# Patient Record
Sex: FEMALE | Race: WHITE | NOT HISPANIC OR LATINO | Employment: FULL TIME | URBAN - METROPOLITAN AREA
[De-identification: names, ages, dates, MRNs, and addresses within clinical notes are randomized per-mention and may not be internally consistent; named-entity substitution may affect disease eponyms.]

---

## 2017-01-09 ENCOUNTER — RECORDS - HEALTHEAST (OUTPATIENT)
Dept: BONE DENSITY | Facility: CLINIC | Age: 57
End: 2017-01-09

## 2017-01-09 ENCOUNTER — RECORDS - HEALTHEAST (OUTPATIENT)
Dept: MAMMOGRAPHY | Facility: CLINIC | Age: 57
End: 2017-01-09

## 2017-01-09 ENCOUNTER — RECORDS - HEALTHEAST (OUTPATIENT)
Dept: ADMINISTRATIVE | Facility: OTHER | Age: 57
End: 2017-01-09

## 2017-01-09 ENCOUNTER — TRANSFERRED RECORDS (OUTPATIENT)
Dept: HEALTH INFORMATION MANAGEMENT | Facility: CLINIC | Age: 57
End: 2017-01-09

## 2017-01-09 DIAGNOSIS — M94.9 DISORDER OF CARTILAGE, UNSPECIFIED: ICD-10-CM

## 2017-01-09 DIAGNOSIS — Z12.31 ENCOUNTER FOR SCREENING MAMMOGRAM FOR MALIGNANT NEOPLASM OF BREAST: ICD-10-CM

## 2017-01-09 DIAGNOSIS — M89.9 DISORDER OF BONE, UNSPECIFIED: ICD-10-CM

## 2017-03-01 ENCOUNTER — COMMUNICATION - HEALTHEAST (OUTPATIENT)
Dept: FAMILY MEDICINE | Facility: CLINIC | Age: 57
End: 2017-03-01

## 2017-03-01 DIAGNOSIS — E78.00 PURE HYPERCHOLESTEROLEMIA: ICD-10-CM

## 2017-03-06 ENCOUNTER — AMBULATORY - HEALTHEAST (OUTPATIENT)
Dept: LAB | Facility: CLINIC | Age: 57
End: 2017-03-06

## 2017-03-06 DIAGNOSIS — E78.00 PURE HYPERCHOLESTEROLEMIA: ICD-10-CM

## 2017-03-06 LAB
CHOLEST SERPL-MCNC: 242 MG/DL
FASTING STATUS PATIENT QL REPORTED: YES
HDLC SERPL-MCNC: 65 MG/DL
LDLC SERPL CALC-MCNC: 158 MG/DL
TRIGL SERPL-MCNC: 96 MG/DL

## 2017-07-10 DIAGNOSIS — B00.9 HSV (HERPES SIMPLEX VIRUS) INFECTION: ICD-10-CM

## 2017-07-10 RX ORDER — VALACYCLOVIR HYDROCHLORIDE 500 MG/1
500 TABLET, FILM COATED ORAL DAILY
Qty: 90 TABLET | Refills: 0 | Status: SHIPPED | OUTPATIENT
Start: 2017-07-10 | End: 2017-08-30

## 2017-08-30 ENCOUNTER — OFFICE VISIT (OUTPATIENT)
Dept: OBGYN | Facility: CLINIC | Age: 57
End: 2017-08-30
Payer: COMMERCIAL

## 2017-08-30 VITALS
WEIGHT: 155 LBS | SYSTOLIC BLOOD PRESSURE: 118 MMHG | BODY MASS INDEX: 25.83 KG/M2 | DIASTOLIC BLOOD PRESSURE: 80 MMHG | HEIGHT: 65 IN

## 2017-08-30 DIAGNOSIS — B00.9 HSV (HERPES SIMPLEX VIRUS) INFECTION: ICD-10-CM

## 2017-08-30 DIAGNOSIS — M85.88 OSTEOPENIA OF SPINE: ICD-10-CM

## 2017-08-30 DIAGNOSIS — Z01.419 ENCOUNTER FOR GYNECOLOGICAL EXAMINATION WITHOUT ABNORMAL FINDING: Primary | ICD-10-CM

## 2017-08-30 DIAGNOSIS — Z13.220 SCREENING FOR LIPOID DISORDERS: ICD-10-CM

## 2017-08-30 PROCEDURE — 99396 PREV VISIT EST AGE 40-64: CPT | Performed by: OBSTETRICS & GYNECOLOGY

## 2017-08-30 PROCEDURE — G0145 SCR C/V CYTO,THINLAYER,RESCR: HCPCS | Performed by: OBSTETRICS & GYNECOLOGY

## 2017-08-30 PROCEDURE — 87624 HPV HI-RISK TYP POOLED RSLT: CPT | Performed by: OBSTETRICS & GYNECOLOGY

## 2017-08-30 RX ORDER — VALACYCLOVIR HYDROCHLORIDE 500 MG/1
500 TABLET, FILM COATED ORAL DAILY
Qty: 90 TABLET | Refills: 11 | Status: SHIPPED | OUTPATIENT
Start: 2017-08-30 | End: 2022-09-20

## 2017-08-30 NOTE — MR AVS SNAPSHOT
After Visit Summary   8/30/2017    Andreia Jorgensen    MRN: [id number]           Patient Information     Date Of Birth          1960        Visit Information        Provider Department      8/30/2017 8:00 AM Sunni Roman MD [facility]        Today's Diagnoses     Encounter for gynecological examination without abnormal finding    -  1    Osteopenia of spine        HSV (herpes simplex virus) infection        Screening for lipoid disorders          Care Instructions    Make lab only appointment fasting (nothing but water 10 hours prior to blood draw) at any [facility].            Follow-ups after your visit        Additional Services     [facility] PT, HAND, AND CHIROPRACTIC REFERRAL       **This order will print in the [facility] Scheduling Office**    Physical Therapy, Hand Therapy and Chiropractic Care are available through:    *[facility]  *[facility]  *[facility]    Call one number to schedule at any of the above locations: [phone].    Your provider has referred you to: Physical Therapy at [facility] or FS    Indication/Reason for Referral: osteopenia of hip and spine (spine worsening)  Onset of Illness: years  Therapy Orders: Evaluate and Treat  Special Programs: None  Special Request: None    Shirin Carpio      Additional Comments for the Therapist or Chiropractor: recent DEXA with worsening of spine, help with exercises     Please be aware that coverage of these services is subject to the terms and limitations of your health insurance plan.  Call member services at your health plan with any benefit or coverage questions.      Please bring the following to your appointment:    *Your personal calendar for scheduling future appointments  *Comfortable clothing                  Future tests that were ordered for you today     Open Future Orders        Priority Expected Expires Ordered    Lipid Profile Routine   "2018            Who to contact     If you have questions or need follow up information about today's clinic visit or your schedule please contact [facility] directly at [phone].  Normal or non-critical lab and imaging results will be communicated to you by MyChart, letter or phone within 4 business days after the clinic has received the results. If you do not hear from us within 7 days, please contact the clinic through MyChart or phone. If you have a critical or abnormal lab result, we will notify you by phone as soon as possible.  Submit refill requests through Drync or call your pharmacy and they will forward the refill request to us. Please allow 3 business days for your refill to be completed.          Additional Information About Your Visit        [employer] Discovery[employer] Information     Drync lets you send messages to your doctor, view your test results, renew your prescriptions, schedule appointments and more. To sign up, go to [email or web address] . Click on \"Log in\" on the left side of the screen, which will take you to the Welcome page. Then click on \"Sign up Now\" on the right side of the page.     You will be asked to enter the access code listed below, as well as some personal information. Please follow the directions to create your username and password.     Your access code is: [email or web address]  Expires: 2017  8:39 AM     Your access code will  in 90 days. If you need help or a new code, please call your [facility] clinic or [phone].        Care EveryWhere ID     This is your Care EveryWhere ID. This could be used by other organizations to access your [facility] medical records  [id number]        Your Vitals Were     Height BMI (Body Mass Index)                5' 5\" (1.651 m) 25.79 kg/m2           Blood Pressure from Last 3 Encounters:   17 118/80   16 128/86   03/13/15 118/77    Weight from Last 3 Encounters:   17 155 lb (70.3 kg)   16 " 151 lb (68.5 kg)   03/13/15 151 lb 6.4 oz (68.7 kg)              We Performed the Following     HPV High Risk Types DNA Cervical     [facility] PT, HAND, AND CHIROPRACTIC REFERRAL     Pap imaged thin layer screen with HPV - recommended age 30 - 65 years (select HPV order below)          Today's Medication Changes          These changes are accurate as of: 8/30/17  8:39 AM.  If you have any questions, ask your nurse or doctor.               These medicines have changed or have updated prescriptions.        Dose/Directions    valACYclovir 500 MG tablet   Commonly known as:  VALTREX   This may have changed:  Another medication with the same name was removed. Continue taking this medication, and follow the directions you see here.   Used for:  HSV (herpes simplex virus) infection   Changed by:  Sunni Roman MD        Dose:  500 mg   Take 1 tablet (500 mg) by mouth daily   Quantity:  90 tablet   Refills:  11         Stop taking these medicines if you haven't already. Please contact your care team if you have questions.     BIEST/PROGESTERONE Crea   Stopped by:  Sunni Roman MD           estradiol 2 MG vaginal ring   Commonly known as:  ESTRING   Stopped by:  Sunni Roman MD                Where to get your medicines      These medications were sent to [id number] - [place], [place] - [address] AT [facility] [address]  [address], [place] [address]    Hours:  24-hours Phone:  [phone]     valACYclovir 500 MG tablet                Primary Care Provider    None Specified       No primary provider on file.        Equal Access to Services     [facility] AH: Hadii tyrone deniso Sojavier, waaxda luqadaha, qaybta kaalmada adeegyada, deshawn wong. So [facility] [phone].    ATENCIÓN: Si habla español, tiene a liang disposición servicios gratuitos de asistencia lingüística. Llame al [phone].    We comply with applicable federal civil  "rights laws and [place] laws. We do not discriminate on the basis of race, color, national origin, age, disability sex, sexual orientation or gender identity.            Thank you!     Thank you for choosing [facility]  for your care. Our goal is always to provide you with excellent care. Hearing back from our patients is one way we can continue to improve our services. Please take a few minutes to complete the written survey that you may receive in the mail after your visit with us. Thank you!             Your Updated Medication List - Protect others around you: Learn how to safely use, store and throw away your medicines at www.disposemymeds.org.          This list is accurate as of: 8/30/17  8:39 AM.  Always use your most recent med list.                   Brand Name Dispense Instructions for use Diagnosis    ascorbic acid 1000 MG Tabs    vitamin C     Take 1,000 mg by mouth daily    Routine gynecological examination       CALCIUM CITRATE PLUS/MAGNESIUM Tabs      With zinc 10mg and Vitamin D3 800IU    Routine gynecological examination       coenzyme Q-10 200 MG Caps      Take by mouth daily    Routine gynecological examination       Lysine 500 MG Caps      Take by mouth daily    Routine gynecological examination       * NONFORMULARY      Arg Sulph LM 6; Take 7 drops in 4 oz of water    Routine gynecological examination       * NONFORMULARY      Red Mineral Algae    Routine gynecological examination       * NONFORMULARY      \"Natural Calm\"; magnesium supplement drink    Routine gynecological examination       valACYclovir 500 MG tablet    VALTREX    90 tablet    Take 1 tablet (500 mg) by mouth daily    HSV (herpes simplex virus) infection       Vitamin B-12 5000 MCG Subl       Routine gynecological examination       Vitamin D-3 5000 UNITS Tabs      Take by mouth daily    Routine gynecological examination       vitamin E 400 UNIT capsule      Take 400 Units by mouth daily    Routine " gynecological examination       * Notice:  This list has 3 medication(s) that are the same as other medications prescribed for you. Read the directions carefully, and ask your doctor or other care provider to review them with you.

## 2017-08-30 NOTE — LETTER
September 11, 2017    Andreia Maloneks  [address]  [place] [address]    Dear Andreia,  We are happy to inform you that your PAP smear result from 08/30/17 is normal.  We are now able to do a follow up test on PAP smears. The DNA test is for HPV (Human Papilloma Virus). Cervical cancer is closely linked with certain types of HPV. Your result showed no evidence of high risk HPV.  Therefore we recommend you return in 5 years for your next pap smear and HPV test.  You will still need to return to the clinic every year for an annual exam and other preventive tests.  Please contact the clinic at [phone] with any questions.  Sincerely,    LORRAINE VASQUEZ MD/[facility]

## 2017-08-30 NOTE — PROGRESS NOTES
Andreia is a 57 year old  who presents for annual exam.   Postmenopausal.  She is having vaginal dryness. No vaginal bleeding noted.     Besides routine health maintenance,  she would like to discuss painful with intercourse.  Now is just not having any sex and not interested.  Doesn't want to keep doing anything topical.  Also has complaints about right eye hemorrhages that happen every now and then, not new.  Right toe pain and cramping into feet that seems to happen a bit.  Seeing derm for some skin tag removal soon.    Did have dexa and mammo done this year at --in care everywhere and wants to discuss results of DEXA.  Needs refill of valtrex that she takes daily.  GYNECOLOGIC HISTORY:  Menarche:  She is not sexually active  STI testing offered?  Declined  Estrogen replacement therapy: Yes -  Vaginal only  KESHAWN exposure: Unknown    History of abnormal Pap smear: NO - age 30- 65 PAP every 3 years recommended  Family history of breast CA: Yes (Please explain): mgm  Family history of uterine/ovarian CA: No  Family history of colon CA: No    HEALTH MAINTENANCE:  Cholesterol: (No components found for: CHOL2 ) History of abnormal lipids: Yes  Mammo: 2017 . History of abnormal Mammo: No  Regular Self Breast Exams: No  Colonoscopy: 2014 History of abnormal Colonoscopy: No  Dexa: 2017 History of abnormal Dexa: Yes  Calcium/Vitamin D intake: source:  dairy, dietary supplement(s) Adequate? No  TSH: (No components found for: TSH1 )  Pap; (No results found for: PAP )    HISTORY:  Obstetric History       T0      L0     SAB0   TAB0   Ectopic0   Multiple0   Live Births0         Past Medical History:   Diagnosis Date     Adenomatous polyps     colon     Dense breast      Low bone mass      Osteopenia      Vitamin D deficiency      Past Surgical History:   Procedure Laterality Date     COLONOSCOPY  2014    Colon normal; Repeat in 5 years     Family History   Problem Relation Age of Onset      "OSTEOPOROSIS Mother      Breast Cancer Maternal Grandmother      Social History     Social History     Marital status:      Spouse name: N/A     Number of children: 0     Years of education: N/A     Occupational History     marketing      Social History Main Topics     Smoking status: Never Smoker     Smokeless tobacco: Never Used     Alcohol use 0.0 oz/week     0 Standard drinks or equivalent per week      Comment: occ     Drug use: No     Sexual activity: Not Currently     Partners: Male     Birth control/ protection: Post-menopausal     Other Topics Concern     Not on file     Social History Narrative       Current Outpatient Prescriptions:      valACYclovir (VALTREX) 500 MG tablet, Take 1 tablet (500 mg) by mouth daily, Disp: 90 tablet, Rfl: 0     Estradiol-Estriol-Progesterone (BIEST/PROGESTERONE) CREA, , Disp: , Rfl:      estradiol (ESTRING) 2 MG vaginal ring, Place 1 each vaginally every 3 months, Disp: 1 each, Rfl: 3     valACYclovir (VALTREX) 1000 mg tablet, Take 2 tablets (2,000 mg) by mouth every 12 hours, Disp: 36 tablet, Rfl: 6     Multiple Minerals-Vitamins (CALCIUM CITRATE PLUS/MAGNESIUM) TABS, With zinc 10mg and Vitamin D3 800IU, Disp: , Rfl:      ascorbic acid (VITAMIN C) 1000 MG TABS, Take 1,000 mg by mouth daily, Disp: , Rfl:      Cholecalciferol (VITAMIN D-3) 5000 UNITS TABS, Take by mouth daily, Disp: , Rfl:      coenzyme Q-10 200 MG CAPS, Take by mouth daily, Disp: , Rfl:      vitamin E 400 UNIT capsule, Take 400 Units by mouth daily, Disp: , Rfl:      Lysine 500 MG CAPS, Take by mouth daily, Disp: , Rfl:      Cyanocobalamin (VITAMIN B-12) 5000 MCG SUBL, , Disp: , Rfl:      NONFORMULARY, Arg Sulph LM 6; Take 7 drops in 4 oz of water, Disp: , Rfl:      NONFORMULARY, Red Mineral Algae, Disp: , Rfl:      NONFORMULARY, \"Natural Calm\"; magnesium supplement drink, Disp: , Rfl:      estradiol (ESTRING) 2 MG vaginal ring, Place 1 each vaginally every 3 months, Disp: 1 each, Rfl: 3     Allergies " "  Allergen Reactions     Penicillins Anaphylaxis       Past medical, surgical, social and family history were reviewed and updated in EPIC.    EXAM:  /80  Ht 5' 5\" (1.651 m)  Wt 155 lb (70.3 kg)  BMI 25.79 kg/m2   BMI: Body mass index is 25.79 kg/(m^2).  Constitutional: healthy, alert and no distress  Head: Normocephalic. No masses, lesions, tenderness or abnormalities  Neck: Neck supple. Trachea midline. No adenopathy. Thyroid symmetric, normal size.   Cardiovascular: RRR.   Respiratory: Negative.   Breast: Breasts reveal mild symmetric fibrocystic densities, but there are no dominant, discrete, fixed or suspicious masses found.  Gastrointestinal: Abdomen soft, non-tender, non-distended. No masses, organomegaly  :  Vulva:  No external lesions, normal female hair distribution, no inguinal adenopathy.    Urethra:  Midline, non-tender, well supported, no discharge  Vagina:  Very Atrophic, no abnormal discharge, no lesions **virginal spec used  Uterus:  Normal size , non-tender, freely mobile  Ovaries:  No masses appreciated, non-tender, mobile  Rectal Exam: deferred  Musculoskeletal: extremities normal  Skin: no suspicious lesions or rashes  Psychiatric: Affect appropriate, cooperative,mentation appears normal.     COUNSELING:   Reviewed preventive health counseling, as reflected in patient instructions       Osteoporosis Prevention/Bone Health   reports that she has never smoked. She has never used smokeless tobacco.    Body mass index is 25.79 kg/(m^2).  Weight management plan: not discussed, probably is helping her osteo    FRAX Risk Assessment  ASSESSMENT:  57 year old  with satisfactory annual exam  (Z01.419) Encounter for gynecological examination without abnormal finding  (primary encounter diagnosis)  Comment: postmenopausal  Plan: HPV High Risk Types DNA Cervical, Pap imaged         thin layer screen with HPV - recommended age 30        - 65 years (select HPV order below), Hepatitis         " C antibody        Discussed sending pap smear for HPV typing as well and that if both pap and HPV are negative, then can have q5 year pap smears.  Discussed stopping at age 65.    Discussed since not going to keep working on vaginal issues, should probably see a primary care doc and me only as needed when due for next pap, etc.  She will find someone to address her feet issues      (M85.88) Osteopenia of spine  Comment: worsening  Plan: [facility] PT, HAND, AND CHIROPRACTIC REFERRAL        Discussed wt bearing exercise and refer to PT for options.    (B00.9) HSV (herpes simplex virus) infection  Comment: daily   Plan: valACYclovir (VALTREX) 500 MG tablet        Refill done.    (Z13.220) Screening for lipoid disorders  Comment: elevated last check  Plan: Lipid Profile        RTC fasting.  Once she finds a primary then they can follow her labs.        Sunni Roman MD

## 2017-08-30 NOTE — NURSING NOTE
"Chief Complaint   Patient presents with     Physical       Initial /80  Ht 5' 5\" (1.651 m)  Wt 155 lb (70.3 kg)  BMI 25.79 kg/m2 Estimated body mass index is 25.79 kg/(m^2) as calculated from the following:    Height as of this encounter: 5' 5\" (1.651 m).    Weight as of this encounter: 155 lb (70.3 kg).  BP completed using cuff size: regular        The following HM Due: NONE      The following patient reported/Care Every where data was sent to:  P ABSTRACT QUALITY INITIATIVES [27534]  Mammogram and Dexa done on this date: 2017 (approximately), by this group: pt. Unsure of location, will find out and fax it in, results were mammo normal, Dexa isn't.      patient has appointment for today             "

## 2017-08-30 NOTE — PATIENT INSTRUCTIONS
Make lab only appointment fasting (nothing but water 10 hours prior to blood draw) at any [facility].

## 2017-08-31 NOTE — NURSING NOTE
Please abstract the following data from this visit with this patient into the appropriate field in Epic:    Mammogram done on this date: 1/9/17 (approximately), by this group: [facility], results were normal.     DEXA 1/9/17 in care everywhere.    Sunni Roman MD

## 2017-09-01 ENCOUNTER — THERAPY VISIT (OUTPATIENT)
Dept: PHYSICAL THERAPY | Facility: CLINIC | Age: 57
End: 2017-09-01
Payer: COMMERCIAL

## 2017-09-01 DIAGNOSIS — M54.50 LUMBAGO: Primary | ICD-10-CM

## 2017-09-01 DIAGNOSIS — Z01.419 ENCOUNTER FOR GYNECOLOGICAL EXAMINATION WITHOUT ABNORMAL FINDING: ICD-10-CM

## 2017-09-01 DIAGNOSIS — Z13.220 SCREENING FOR LIPOID DISORDERS: ICD-10-CM

## 2017-09-01 LAB
CHOLEST SERPL-MCNC: 248 MG/DL
COPATH REPORT: NORMAL
HCV AB SERPL QL IA: NONREACTIVE
HDLC SERPL-MCNC: 78 MG/DL
LDLC SERPL CALC-MCNC: 147 MG/DL
NONHDLC SERPL-MCNC: 170 MG/DL
PAP: NORMAL
TRIGL SERPL-MCNC: 115 MG/DL

## 2017-09-01 PROCEDURE — 80061 LIPID PANEL: CPT | Performed by: OBSTETRICS & GYNECOLOGY

## 2017-09-01 PROCEDURE — 86803 HEPATITIS C AB TEST: CPT | Performed by: OBSTETRICS & GYNECOLOGY

## 2017-09-01 PROCEDURE — 97530 THERAPEUTIC ACTIVITIES: CPT | Mod: GP | Performed by: PHYSICAL THERAPIST

## 2017-09-01 PROCEDURE — 36415 COLL VENOUS BLD VENIPUNCTURE: CPT | Performed by: OBSTETRICS & GYNECOLOGY

## 2017-09-01 PROCEDURE — 97161 PT EVAL LOW COMPLEX 20 MIN: CPT | Mod: GP | Performed by: PHYSICAL THERAPIST

## 2017-09-01 NOTE — MR AVS SNAPSHOT
After Visit Summary   9/1/2017    Andreia Jorgensen    MRN: [id number]           Patient Information     Date Of Birth          1960        Visit Information        Provider Department      9/1/2017 8:10 AM Sai Handy PT [facility] Athletic [facility] Physical [facility]        Today's Diagnoses     Lumbago    -  1       Follow-ups after your visit        Who to contact     If you have questions or need follow up information about today's clinic visit or your schedule please contact [facility] ATHLETIC [facility] PHYSICAL [facility] directly at [phone].  Normal or non-critical lab and imaging results will be communicated to you by Terra[employer] Systemshart, letter or phone within 4 business days after the clinic has received the results. If you do not hear from us within 7 days, please contact the clinic through icanbuyt or phone. If you have a critical or abnormal lab result, we will notify you by phone as soon as possible.  Submit refill requests through [employer] or call your pharmacy and they will forward the refill request to us. Please allow 3 business days for your refill to be completed.          Additional Information About Your Visit        MyChart Information     [employer] gives you secure access to your electronic health record. If you see a primary care provider, you can also send messages to your care team and make appointments. If you have questions, please call your primary care clinic.  If you do not have a primary care provider, please call [phone] and they will assist you.        Care EveryWhere ID     This is your Care EveryWhere ID. This could be used by other organizations to access your [facility] medical records  [id number]         Blood Pressure from Last 3 Encounters:   08/30/17 118/80   06/07/16 128/86   03/13/15 118/77    Weight from Last 3 Encounters:   08/30/17 70.3 kg (155 lb)   06/07/16 68.5 kg (151 lb)   03/13/15 68.7 kg (151 lb 6.4 oz)               We Performed the Following     PT Eval, Low Complexity (83312)     Therapeutic Activities     Therapeutic Activities        Primary Care Provider    None Specified       No primary provider on file.        Equal Access to Services     [facility] CHRISTIE : Hadii aad ku hadcarmitagetachew Landry, farooqsuad powelllawrenceha, darwincharity riveraginna kasiarmen, deshawn rayshawnin hayaaalanna villaseñorteri urban ladominicalanna judith. So [facility] [phone].    ATENCIÓN: Si habla español, tiene a liang disposición servicios gratuitos de asistencia lingüística. Llame al [phone].    We comply with applicable federal civil rights laws and [place] laws. We do not discriminate on the basis of race, color, national origin, age, disability sex, sexual orientation or gender identity.            Thank you!     Thank you for choosing [facility]TH[facility]IC [facility] PHYSICAL [facility]  for your care. Our goal is always to provide you with excellent care. Hearing back from our patients is one way we can continue to improve our services. Please take a few minutes to complete the written survey that you may receive in the mail after your visit with us. Thank you!             Your Updated Medication List - Protect others around you: Learn how to safely use, store and throw away your medicines at www.disposemymeds.org.          This list is accurate as of: 9/1/17  9:02 AM.  Always use your most recent med list.                   Brand Name Dispense Instructions for use Diagnosis    ascorbic acid 1000 MG Tabs    vitamin C     Take 1,000 mg by mouth daily    Routine gynecological examination       CALCIUM CITRATE PLUS/MAGNESIUM Tabs      With zinc 10mg and Vitamin D3 800IU    Routine gynecological examination       coenzyme Q-10 200 MG Caps      Take by mouth daily    Routine gynecological examination       Lysine 500 MG Caps      Take by mouth daily    Routine gynecological examination       * NONFORMULARY      Arg Sulph LM 6; Take 7 drops in 4 oz of water    Routine  "gynecological examination       * NONFORMULARY      Red Mineral Algae    Routine gynecological examination       * NONFORMULARY      \"Natural Calm\"; magnesium supplement drink    Routine gynecological examination       valACYclovir 500 MG tablet    VALTREX    90 tablet    Take 1 tablet (500 mg) by mouth daily    HSV (herpes simplex virus) infection       Vitamin B-12 5000 MCG Subl       Routine gynecological examination       Vitamin D-3 5000 UNITS Tabs      Take by mouth daily    Routine gynecological examination       vitamin E 400 UNIT capsule      Take 400 Units by mouth daily    Routine gynecological examination       * Notice:  This list has 3 medication(s) that are the same as other medications prescribed for you. Read the directions carefully, and ask your doctor or other care provider to review them with you.      "

## 2017-09-01 NOTE — PROGRESS NOTES
Subjective:    Patient is a 57 year old female presenting with rehab back hpi.   Andreia Jorgensen is a 57 year old female with a lumbar condition.    Condition occurred: at home.  This is a new condition  Pt presents to PT with c/o hx L hip and spine pain and a diagnosis of osteopenia.  She would like to work on weight bearing exercises and discuss a safe return to exercise program to avoid irritating her low back and knees.    Pt reports she ran a 5k end of April.  She reports she had some low back after running.  She also gets pain when attempting to progress with weight lifting in the gym.    Pt has done Yoga sculpt, pump class, pilates, personal training, and running.   Currently patient walks the dog 2x/day for 1.5mph.  She is also still doing Yoga sculpt.  She is doing and upper and lower body weight training routine inconsistently.  She is not doing core.    MRI: Mild DDD lumbar    PMH: low back pain, L hip pain 2012, B knee pain, osteopenia since 2004.    Site of Pain: L low back pain.  Radiates to: n/a.  Quality: Catching pain. and is intermittent Pain Scale: none at the moment.  Associated symptoms:  Loss of strength and loss of motion. Pain is the same all the time.  Exacerbated by: Running for more than 1 mile, too heavy weights in the gym , shoulder  and relieved by rest.  Since onset symptoms are unchanged.  Special tests:  MRI and x-ray.  Previous treatment: none.  Improvement with previous treatment: n/a.  General health as reported by patient is good.                                              Objective:    System    Physical Exam        General Evaluation:  AROM:  normal            PROM:  normal            Gross Strength:  Gross strength wnl general: At least antigravity strength throughout.                                  Functional Assessment:  Functional assessment wnl: Peforms sit to stand without UE support.   Able to demonstrate proper squat/lunge technique.          Gait:  Gait wnl  general: Pt amb without AD.                                         ROS    Assessment/Plan:      Patient is a 57 year old female with lumbar complaints.    Patient has the following significant findings with corresponding treatment plan.                Diagnosis 1:  Hip and spine  Pain -  hot/cold therapy  Decreased ROM/flexibility - manual therapy and therapeutic exercise  Decreased joint mobility - manual therapy and therapeutic exercise  Decreased strength - therapeutic exercise and therapeutic activities  Impaired balance - neuro re-education and therapeutic activities  Decreased proprioception - neuro re-education and therapeutic activities  Inflammation - cold therapy  Impaired gait - gait training  Impaired muscle performance - neuro re-education  Decreased function - therapeutic activities  Impaired posture - neuro re-education    Therapy Evaluation Codes:   1) History comprised of:   Personal factors that impact the plan of care:      None.    Comorbidity factors that impact the plan of care are:      None.     Medications impacting care: None.  2) Examination of Body Systems comprised of:   Body structures and functions that impact the plan of care:      Lumbar spine.   Activity limitations that impact the plan of care are:      Running and Squatting/kneeling.  3) Clinical presentation characteristics are:   Stable/Uncomplicated.  4) Decision-Making    Low complexity using standardized patient assessment instrument and/or measureable assessment of functional outcome.  Cumulative Therapy Evaluation is: Low complexity.    Previous and current functional limitations:  (See Goal Flow Sheet for this information)    Short term and Long term goals: (See Goal Flow Sheet for this information)     Communication ability:  Patient appears to be able to clearly communicate and understand verbal and written communication and follow directions correctly.  Treatment Explanation - The following has been discussed with the  patient:   RX ordered/plan of care  Anticipated outcomes  Possible risks and side effects  This patient would benefit from PT intervention to resume normal activities.   Rehab potential is excellent.    Frequency:  1 X week, once daily  Duration:  for 6 weeks  Discharge Plan:  Achieve all LTG.  Independent in home treatment program.  Return to work with or without restrictions.  Return to previous functional level by discharge.  Reach maximal therapeutic benefit.    Please refer to the daily flowsheet for treatment today, total treatment time and time spent performing 1:1 timed codes.

## 2017-09-05 LAB
FINAL DIAGNOSIS: NORMAL
HPV HR 12 DNA CVX QL NAA+PROBE: NEGATIVE
HPV16 DNA SPEC QL NAA+PROBE: NEGATIVE
HPV18 DNA SPEC QL NAA+PROBE: NEGATIVE
SPECIMEN DESCRIPTION: NORMAL

## 2018-01-03 ENCOUNTER — COMMUNICATION - HEALTHEAST (OUTPATIENT)
Dept: FAMILY MEDICINE | Facility: CLINIC | Age: 58
End: 2018-01-03

## 2018-01-04 ENCOUNTER — OFFICE VISIT - HEALTHEAST (OUTPATIENT)
Dept: FAMILY MEDICINE | Facility: CLINIC | Age: 58
End: 2018-01-04

## 2018-01-04 DIAGNOSIS — J11.1 INFLUENZA-LIKE ILLNESS: ICD-10-CM

## 2018-05-17 ENCOUNTER — OFFICE VISIT - HEALTHEAST (OUTPATIENT)
Dept: FAMILY MEDICINE | Facility: CLINIC | Age: 58
End: 2018-05-17

## 2018-05-17 DIAGNOSIS — B00.9 HERPES: ICD-10-CM

## 2018-05-17 DIAGNOSIS — E78.00 PURE HYPERCHOLESTEROLEMIA: ICD-10-CM

## 2018-05-17 DIAGNOSIS — M17.0 OSTEOARTHRITIS OF KNEES, BILATERAL: ICD-10-CM

## 2018-05-17 DIAGNOSIS — M79.674 TOE PAIN, RIGHT: ICD-10-CM

## 2018-05-17 DIAGNOSIS — Z00.00 ROUTINE GENERAL MEDICAL EXAMINATION AT A HEALTH CARE FACILITY: ICD-10-CM

## 2018-05-17 DIAGNOSIS — R63.5 WEIGHT GAIN: ICD-10-CM

## 2018-05-17 DIAGNOSIS — Z12.31 VISIT FOR SCREENING MAMMOGRAM: ICD-10-CM

## 2018-05-17 LAB
CHOLEST SERPL-MCNC: 268 MG/DL
FASTING STATUS PATIENT QL REPORTED: YES
HDLC SERPL-MCNC: 59 MG/DL
LDLC SERPL CALC-MCNC: 176 MG/DL
T4 FREE SERPL-MCNC: 1 NG/DL (ref 0.7–1.8)
TRIGL SERPL-MCNC: 166 MG/DL
TSH SERPL DL<=0.005 MIU/L-ACNC: 3.18 UIU/ML (ref 0.3–5)
URATE SERPL-MCNC: 5.4 MG/DL (ref 2–7.5)

## 2018-05-17 ASSESSMENT — MIFFLIN-ST. JEOR: SCORE: 1304.78

## 2018-05-25 ENCOUNTER — RECORDS - HEALTHEAST (OUTPATIENT)
Dept: MAMMOGRAPHY | Facility: CLINIC | Age: 58
End: 2018-05-25

## 2018-05-25 DIAGNOSIS — Z12.31 ENCOUNTER FOR SCREENING MAMMOGRAM FOR MALIGNANT NEOPLASM OF BREAST: ICD-10-CM

## 2018-09-28 ENCOUNTER — COMMUNICATION - HEALTHEAST (OUTPATIENT)
Dept: FAMILY MEDICINE | Facility: CLINIC | Age: 58
End: 2018-09-28

## 2018-09-28 DIAGNOSIS — B00.9 HERPES: ICD-10-CM

## 2018-10-02 ENCOUNTER — COMMUNICATION - HEALTHEAST (OUTPATIENT)
Dept: FAMILY MEDICINE | Facility: CLINIC | Age: 58
End: 2018-10-02

## 2019-01-04 ENCOUNTER — AMBULATORY - HEALTHEAST (OUTPATIENT)
Dept: NURSING | Facility: CLINIC | Age: 59
End: 2019-01-04

## 2019-01-04 ENCOUNTER — COMMUNICATION - HEALTHEAST (OUTPATIENT)
Dept: FAMILY MEDICINE | Facility: CLINIC | Age: 59
End: 2019-01-04

## 2019-01-04 DIAGNOSIS — Z00.00 PREVENTATIVE HEALTH CARE: ICD-10-CM

## 2019-03-15 ENCOUNTER — DOCUMENTATION ONLY (OUTPATIENT)
Dept: OTHER | Facility: CLINIC | Age: 59
End: 2019-03-15

## 2019-03-15 ENCOUNTER — AMBULATORY - HEALTHEAST (OUTPATIENT)
Dept: OTHER | Facility: CLINIC | Age: 59
End: 2019-03-15

## 2019-05-21 ENCOUNTER — OFFICE VISIT - HEALTHEAST (OUTPATIENT)
Dept: FAMILY MEDICINE | Facility: CLINIC | Age: 59
End: 2019-05-21

## 2019-05-21 ENCOUNTER — AMBULATORY - HEALTHEAST (OUTPATIENT)
Dept: FAMILY MEDICINE | Facility: CLINIC | Age: 59
End: 2019-05-21

## 2019-05-21 DIAGNOSIS — Z12.31 VISIT FOR SCREENING MAMMOGRAM: ICD-10-CM

## 2019-05-21 DIAGNOSIS — M85.852 OSTEOPENIA OF BOTH HIPS: ICD-10-CM

## 2019-05-21 DIAGNOSIS — M79.642 PAIN IN BOTH HANDS: ICD-10-CM

## 2019-05-21 DIAGNOSIS — B00.9 HERPES SIMPLEX VIRUS INFECTION: ICD-10-CM

## 2019-05-21 DIAGNOSIS — Z13.220 SCREENING, LIPID: ICD-10-CM

## 2019-05-21 DIAGNOSIS — Z00.00 ROUTINE GENERAL MEDICAL EXAMINATION AT A HEALTH CARE FACILITY: ICD-10-CM

## 2019-05-21 DIAGNOSIS — Z12.11 SPECIAL SCREENING FOR MALIGNANT NEOPLASMS, COLON: ICD-10-CM

## 2019-05-21 DIAGNOSIS — M25.551 HIP PAIN, RIGHT: ICD-10-CM

## 2019-05-21 DIAGNOSIS — Z12.4 PAPANICOLAOU SMEAR FOR CERVICAL CANCER SCREENING: ICD-10-CM

## 2019-05-21 DIAGNOSIS — M25.552 HIP PAIN, LEFT: ICD-10-CM

## 2019-05-21 DIAGNOSIS — M85.851 OSTEOPENIA OF BOTH HIPS: ICD-10-CM

## 2019-05-21 DIAGNOSIS — M79.641 PAIN IN BOTH HANDS: ICD-10-CM

## 2019-05-21 ASSESSMENT — MIFFLIN-ST. JEOR: SCORE: 1304.43

## 2019-05-24 ENCOUNTER — RECORDS - HEALTHEAST (OUTPATIENT)
Dept: GENERAL RADIOLOGY | Facility: CLINIC | Age: 59
End: 2019-05-24

## 2019-05-24 ENCOUNTER — AMBULATORY - HEALTHEAST (OUTPATIENT)
Dept: LAB | Facility: CLINIC | Age: 59
End: 2019-05-24

## 2019-05-24 DIAGNOSIS — Z13.220 SCREENING, LIPID: ICD-10-CM

## 2019-05-24 DIAGNOSIS — M25.552 HIP PAIN, LEFT: ICD-10-CM

## 2019-05-24 DIAGNOSIS — M25.551 HIP PAIN, RIGHT: ICD-10-CM

## 2019-05-24 DIAGNOSIS — M79.641 PAIN IN BOTH HANDS: ICD-10-CM

## 2019-05-24 DIAGNOSIS — M25.552 PAIN IN LEFT HIP: ICD-10-CM

## 2019-05-24 DIAGNOSIS — M79.642 PAIN IN BOTH HANDS: ICD-10-CM

## 2019-05-24 DIAGNOSIS — M25.551 PAIN IN RIGHT HIP: ICD-10-CM

## 2019-05-24 DIAGNOSIS — B00.9 HERPES SIMPLEX VIRUS INFECTION: ICD-10-CM

## 2019-05-24 LAB
ALBUMIN SERPL-MCNC: 3.3 G/DL (ref 3.5–5)
ALP SERPL-CCNC: 73 U/L (ref 45–120)
ALT SERPL W P-5'-P-CCNC: 20 U/L (ref 0–45)
ANION GAP SERPL CALCULATED.3IONS-SCNC: 8 MMOL/L (ref 5–18)
AST SERPL W P-5'-P-CCNC: 22 U/L (ref 0–40)
BASOPHILS # BLD AUTO: 0 THOU/UL (ref 0–0.2)
BASOPHILS NFR BLD AUTO: 1 % (ref 0–2)
BILIRUB SERPL-MCNC: 0.5 MG/DL (ref 0–1)
BUN SERPL-MCNC: 17 MG/DL (ref 8–22)
C REACTIVE PROTEIN LHE: 0.1 MG/DL (ref 0–0.8)
CALCIUM SERPL-MCNC: 9.5 MG/DL (ref 8.5–10.5)
CHLORIDE BLD-SCNC: 103 MMOL/L (ref 98–107)
CHOLEST SERPL-MCNC: 244 MG/DL
CO2 SERPL-SCNC: 29 MMOL/L (ref 22–31)
CREAT SERPL-MCNC: 0.84 MG/DL (ref 0.6–1.1)
EOSINOPHIL # BLD AUTO: 0.2 THOU/UL (ref 0–0.4)
EOSINOPHIL NFR BLD AUTO: 4 % (ref 0–6)
ERYTHROCYTE [DISTWIDTH] IN BLOOD BY AUTOMATED COUNT: 11.7 % (ref 11–14.5)
ERYTHROCYTE [SEDIMENTATION RATE] IN BLOOD BY WESTERGREN METHOD: 23 MM/HR (ref 0–20)
FASTING STATUS PATIENT QL REPORTED: YES
GFR SERPL CREATININE-BSD FRML MDRD: >60 ML/MIN/1.73M2
GLUCOSE BLD-MCNC: 86 MG/DL (ref 70–125)
HCT VFR BLD AUTO: 40.1 % (ref 35–47)
HDLC SERPL-MCNC: 58 MG/DL
HGB BLD-MCNC: 13.7 G/DL (ref 12–16)
LDLC SERPL CALC-MCNC: 162 MG/DL
LYMPHOCYTES # BLD AUTO: 2.3 THOU/UL (ref 0.8–4.4)
LYMPHOCYTES NFR BLD AUTO: 36 % (ref 20–40)
MCH RBC QN AUTO: 33 PG (ref 27–34)
MCHC RBC AUTO-ENTMCNC: 34.1 G/DL (ref 32–36)
MCV RBC AUTO: 97 FL (ref 80–100)
MONOCYTES # BLD AUTO: 0.4 THOU/UL (ref 0–0.9)
MONOCYTES NFR BLD AUTO: 6 % (ref 2–10)
NEUTROPHILS # BLD AUTO: 3.4 THOU/UL (ref 2–7.7)
NEUTROPHILS NFR BLD AUTO: 54 % (ref 50–70)
PLATELET # BLD AUTO: 183 THOU/UL (ref 140–440)
PMV BLD AUTO: 7.4 FL (ref 7–10)
POTASSIUM BLD-SCNC: 4.7 MMOL/L (ref 3.5–5)
PROT SERPL-MCNC: 6.6 G/DL (ref 6–8)
RBC # BLD AUTO: 4.14 MILL/UL (ref 3.8–5.4)
SODIUM SERPL-SCNC: 140 MMOL/L (ref 136–145)
TRIGL SERPL-MCNC: 121 MG/DL
WBC: 6.2 THOU/UL (ref 4–11)

## 2019-05-28 LAB
HPV SOURCE: NORMAL
HUMAN PAPILLOMA VIRUS 16 DNA: NEGATIVE
HUMAN PAPILLOMA VIRUS 18 DNA: NEGATIVE
HUMAN PAPILLOMA VIRUS FINAL DIAGNOSIS: NORMAL
HUMAN PAPILLOMA VIRUS OTHER HR: NEGATIVE
SPECIMEN DESCRIPTION: NORMAL

## 2019-05-29 ENCOUNTER — RECORDS - HEALTHEAST (OUTPATIENT)
Dept: ADMINISTRATIVE | Facility: OTHER | Age: 59
End: 2019-05-29

## 2019-05-29 ENCOUNTER — RECORDS - HEALTHEAST (OUTPATIENT)
Dept: MAMMOGRAPHY | Facility: CLINIC | Age: 59
End: 2019-05-29

## 2019-05-29 ENCOUNTER — RECORDS - HEALTHEAST (OUTPATIENT)
Dept: BONE DENSITY | Facility: CLINIC | Age: 59
End: 2019-05-29

## 2019-05-29 DIAGNOSIS — M85.852 OTHER SPECIFIED DISORDERS OF BONE DENSITY AND STRUCTURE, LEFT THIGH: ICD-10-CM

## 2019-05-29 DIAGNOSIS — M85.851 OTHER SPECIFIED DISORDERS OF BONE DENSITY AND STRUCTURE, RIGHT THIGH: ICD-10-CM

## 2019-05-29 DIAGNOSIS — Z12.31 ENCOUNTER FOR SCREENING MAMMOGRAM FOR MALIGNANT NEOPLASM OF BREAST: ICD-10-CM

## 2019-06-03 LAB

## 2019-11-03 ENCOUNTER — HEALTH MAINTENANCE LETTER (OUTPATIENT)
Age: 59
End: 2019-11-03

## 2019-11-04 ENCOUNTER — RECORDS - HEALTHEAST (OUTPATIENT)
Dept: ADMINISTRATIVE | Facility: OTHER | Age: 59
End: 2019-11-04

## 2019-11-06 ENCOUNTER — RECORDS - HEALTHEAST (OUTPATIENT)
Dept: ADMINISTRATIVE | Facility: OTHER | Age: 59
End: 2019-11-06

## 2020-06-10 ENCOUNTER — COMMUNICATION - HEALTHEAST (OUTPATIENT)
Dept: FAMILY MEDICINE | Facility: CLINIC | Age: 60
End: 2020-06-10

## 2020-08-05 ENCOUNTER — RECORDS - HEALTHEAST (OUTPATIENT)
Dept: ADMINISTRATIVE | Facility: OTHER | Age: 60
End: 2020-08-05

## 2020-08-27 ENCOUNTER — HOSPITAL ENCOUNTER (OUTPATIENT)
Dept: MAMMOGRAPHY | Facility: CLINIC | Age: 60
Discharge: HOME | End: 2020-08-27
Attending: FAMILY MEDICINE

## 2020-08-27 DIAGNOSIS — Z12.31 SCREENING MAMMOGRAM, ENCOUNTER FOR: ICD-10-CM

## 2020-09-04 ENCOUNTER — OFFICE VISIT - HEALTHEAST (OUTPATIENT)
Dept: FAMILY MEDICINE | Facility: CLINIC | Age: 60
End: 2020-09-04

## 2020-09-04 DIAGNOSIS — Z13.220 SCREENING, LIPID: ICD-10-CM

## 2020-09-04 DIAGNOSIS — Z72.820 POOR SLEEP: ICD-10-CM

## 2020-09-04 DIAGNOSIS — Z11.59 NEED FOR HEPATITIS C SCREENING TEST: ICD-10-CM

## 2020-09-04 DIAGNOSIS — Z00.00 ROUTINE GENERAL MEDICAL EXAMINATION AT A HEALTH CARE FACILITY: ICD-10-CM

## 2020-09-04 DIAGNOSIS — Z12.4 SCREENING FOR CERVICAL CANCER: ICD-10-CM

## 2020-09-04 LAB
CHOLEST SERPL-MCNC: 262 MG/DL
FASTING STATUS PATIENT QL REPORTED: YES
HDLC SERPL-MCNC: 57 MG/DL
LDLC SERPL CALC-MCNC: 172 MG/DL
TRIGL SERPL-MCNC: 166 MG/DL

## 2020-09-04 ASSESSMENT — MIFFLIN-ST. JEOR: SCORE: 1304.78

## 2020-09-07 LAB — HCV AB SERPL QL IA: NEGATIVE

## 2020-10-16 ENCOUNTER — MYC MEDICAL ADVICE (OUTPATIENT)
Dept: SLEEP MEDICINE | Facility: CLINIC | Age: 60
End: 2020-10-16

## 2020-10-28 ENCOUNTER — VIRTUAL VISIT (OUTPATIENT)
Dept: SLEEP MEDICINE | Facility: CLINIC | Age: 60
End: 2020-10-28
Payer: COMMERCIAL

## 2020-10-28 VITALS — BODY MASS INDEX: 22.73 KG/M2 | WEIGHT: 150 LBS | HEIGHT: 68 IN

## 2020-10-28 DIAGNOSIS — R06.83 SNORING: ICD-10-CM

## 2020-10-28 DIAGNOSIS — G47.19 EXCESSIVE DAYTIME SLEEPINESS: Primary | ICD-10-CM

## 2020-10-28 PROCEDURE — 99205 OFFICE O/P NEW HI 60 MIN: CPT | Mod: 95 | Performed by: STUDENT IN AN ORGANIZED HEALTH CARE EDUCATION/TRAINING PROGRAM

## 2020-10-28 RX ORDER — TRETINOIN 0.5 MG/G
CREAM TOPICAL
COMMUNITY
Start: 2020-02-26 | End: 2021-09-15

## 2020-10-28 ASSESSMENT — MIFFLIN-ST. JEOR: SCORE: 1293.9

## 2020-10-28 NOTE — PROGRESS NOTES
"  [facility]  Referral for: sleep apnea  Patient Name: Andreia Jorgensen  MRN: [id number]  : 1960  Date of Clinic Visit: 2020  Primary Care Provider: No primary care provider on file.  Referring Provider: No ref. provider found  -------------------------------------------------------------------------------------------------------------------------------  CHIEF COMPLAINT: \"I had a research study done that showed I have sleep apnea.\"    HISTORY OF PRESENT ILLNESS:  Andreia Jorgensen is a 60 year old female w/ PMH of osteopenia, perimenopausal presenting for evaluation of possible sleep apnea after she had a research study.  Andreia is an employee at [employer] in [place] and had access to a research study they are performing which is where she had her PSG.  She was able to provide the PSG technologist report: Total sleep time for 22.5 minutes, sleep latency 5 minutes, REM latency 128.5 minutes, sleep efficiency 89.9%, AHI of 8.8, RDI 25.1, REM AHI 30.3, no hypoxemia.  Unfortunately we do not have access to the PSG interpretation report.  She is mostly curious about understanding the report and what apneas are and how severe her diseases.  She does endorse snoring so bad that her  has intermittently moved to another room, but no apneas have apparently been witnessed by anybody (although they were clearly captured by the study).  She does seem to have some daytime symptoms from it, [place] score of 16.  She indicates she even has trouble staying awake during meetings, has fallen asleep during one.  She denies any difficulties with drowsy driving.   She also describes one additional problem of some insomnia.  She works in the [email or web address] group of her company and this requires her to have some late night computer interactions.  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  Unfortunately this has to be done manually with her current system and there is no way around this.  She has been doing this for about 3 years.  She recently got a supplement called \"Warner Mccrary\" to help her sleep and she thinks it is working.    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  Wake time: 7 AM weekdays and weekends, no alarm  Sleep inertia: Minimal  Naps: sometimes will take a nap after work before her \"second shift,\" 20-30 minutes, refreshing  Preferred sleep position: starts on side, wakes supine    Sleep-related breathing  Snoring: Yes  Apneas: Yes  Waking up snoring/choking/gasping: No  Morning HAs: Yes several days/week  Dry mouth: Yes nearly daily    Sleep-related behaviors  Restless legs: No  Active sleeper?: No  Dream enactment: No  Sleep walking: No  Recurrent Nightmares: No (she highlighted night terrors on the form but after discussion was not consistent with night terrors)  Sleep paralysis: No  Confusional arousals: No    Sleep hygiene  Sleep partners:  (in another room because of snoring)  Caffeine: 3 cups coffee/day, does not drink within 6 hours of bedtime  Sleep-aids: Warner Mccrary recently which she thinks has been helpful  Screen use: reads on iPad before bed (does not use night shift\"    Family history  Pertinent positives: RILEY in Brother  Pertinent negatives: RLS, Narcolepsy, other sleep " "disturbance    ASSESSMENT AND PLAN:  Andreia Jorgensen is a 60 year old female with significant PMH of osteopenia presenting for evaluation of sleep test results from a research study.    # RILEY: diagnosed via research PSG, so will need to get a clinical PSG, although we expect results to be in the same range   - PSG  #EDS: ESS 16. Could be multifactorial, if she has untreated sleep apnea plus the behavioral-insomnia that she reports which could be limiting total sleep time.  # Chronic insomnia: This was not the initial reason she made today's visit, but we uncovered some of this during our conversation.  Sounds like most of this is likely behavioral, with late night screen use, nighttime awakenings with stimulating work on the computer, and when she has difficulty going back to sleep continues to use her screen and does other maladaptive behaviors.  We discussed these behaviors and ways that she might be able to mitigate the risks including improving her sleep hygiene and wearing bluelight glasses after 9 PM.  There also could be a perimenopausal component to this, suggested a little bit by the hip gram seen in the research study, we will evaluate further with a new PSG data.  If this continues to be a problem despite improved sleep hygiene, we can consider a CBT-I referral.    The plan was formulated with Dr. Kang.    Hugh Urbina MD  Sleep Medicine Fellow  [facility]  -------------------------------------------------------------------------------------------------------------------------------  PHYSICAL EXAMINATION:  Vitals: Ht 1.727 m (5' 8\")   Wt 68 kg (150 lb)   BMI 22.81 kg/m    General: no apparent distress, appears stated age  Pulmonary: non-labored on room air, no shortness of breath, no increased work of breathing  Neuro: alert; language is fluent with intact comprehension, no facial asymmetry or ptosis, normal speech, no abnormal movements  Psych: cooperative, " "appropriate mood and affect    INVESTIGATIONS:  Prior PSG: May 2020 [facility]    CO2 = None    ECHO: None    REVIEW OF SYSTEMS: 12-point RoS negative except as per HPI.  -------------------------------------------------------------------------------------------------------------------------------  Allergies   Allergen Reactions     Penicillins Anaphylaxis     Amoxicillin Hives     Nuts Hives     Current Outpatient Medications   Medication Sig Dispense Refill     ascorbic acid (VITAMIN C) 1000 MG TABS Take 1,000 mg by mouth daily       Cholecalciferol (VITAMIN D-3) 5000 UNITS TABS Take by mouth daily       Cyanocobalamin (VITAMIN B-12) 5000 MCG SUBL        Lysine 500 MG CAPS Take by mouth daily       Multiple Minerals-Vitamins (CALCIUM CITRATE PLUS/MAGNESIUM) TABS With zinc 10mg and Vitamin D3 800IU       NONFORMULARY Arg Sulph LM 6; Take 7 drops in 4 oz of water       NONFORMULARY \"Natural Calm\"; magnesium supplement drink       tretinoin (RETIN-A) 0.05 % external cream Apply topically THINLY at bedtime. Moisturize after. SKIP if irritated.       valACYclovir (VALTREX) 500 MG tablet Take 1 tablet (500 mg) by mouth daily 90 tablet 11     vitamin E 400 UNIT capsule Take 400 Units by mouth daily       Past Medical History:   Diagnosis Date     Adenomatous polyps     colon     Dense breast      Low bone mass      Osteopenia      Vitamin D deficiency      Past Surgical History:   Procedure Laterality Date     COLONOSCOPY  11/03/2014    Colon normal; Repeat in 5 years     EYE SURGERY  spring 2017    PTK and refractive     Family History   Problem Relation Age of Onset     Osteoporosis Mother      Breast Cancer Maternal Grandmother      This note was written with the assistance of the Dragon voice-dictation technology software. The final document, although reviewed, may contain errors. For corrections, please contact the " office.  -------------------------------------------------------------------------------------------------------------------------------     As the attending physician I was present with Dr Urbina during this clinic visit. I personally reviewed the key aspects of the history and physical exam, reviewed the pathophysiology, diagnosis and treatment options with the patient and I agree with the above documentation. This note reflects our mutual assessment and plan.     Jorge Alberto Kang MD   of Medicine  Pulmonary, Critical Care and Sleep Medicine  [facility]

## 2020-10-28 NOTE — PATIENT INSTRUCTIONS
"1. We will schedule you for an in-lab sleep study to determine in a clinical setting if you have sleep apnea. We apologize that we cannot use the data from the research study, but the settings for data acquisition may have been different. Follow-up with Dr. Urbina to discuss results and next steps.    What is Obstructive Sleep Apnea (RILEY)? RILEY is the most common type of sleep apnea. Apnea means \"without breath.\"  Apnea is most often caused by narrowing or collapse of the upper airway as muscles relax during sleep. Almost everyone has occasional apneas. Most people with sleep apnea have had brief interruptions at night frequently for many years.  The severity of sleep apnea is related to how frequent and severe the events are.    What are the consequences of RILEY? Symptoms include: feeling sleepy during the day, snoring loudly, gasping or stopping of breathing, trouble sleeping, and occasionally morning headaches or heartburn at night.  Sleepiness can be serious and even increase the risk of falling asleep while driving. Other health consequences may include development of high blood pressure and other cardiovascular disease in persons who are susceptible. Untreated RILEY  can contribute to heart disease, stroke and diabetes.    What are the treatment options? In most situations, sleep apnea is a lifelong disease that must be managed with daily therapy. Medications are not effective for sleep apnea and surgery is generally not considered until other therapies have been tried. Your treatment is your choice . Continuous Positive Airway (CPAP) works right away and is the therapy that is effective in nearly everyone. An oral device to hold your jaw forward is usually the next most reliable option. Other options include postioning devices (to keep you off your back), weight loss, and surgery including a tongue pacing device. There is more detail about some of these options below.    Important tips for using CPAP and similar " devices  Know your equipment:  CPAP is continuous positive airway pressure that prevents obstructive sleep apnea by keeping the throat from collapsing while you are sleeping. In most cases, the device is  smart  and can slowly self-adjusts if your throat collapses and keeps a record every day of how well you are treated-this information is available to you and your care team.    BPAP is bilevel positive airway pressure that keeps your throat open and also assists each breath with a pressure boost to maintain adequate breathing.  Special kinds of BPAP are used in patients who have inadequate breathing from lung or heart disease. In most cases, the device is  smart  and can slowly self-adjusts to assist breathing. Like CPAP, the device keeps a record of how well you are treated.    Your mask is your connection to the device. You get to choose what feels most comfortable and the staff will help to make sure if fits. Here: are some examples of the different masks that are available:         Key points to remember on your journey with sleep apnea:  1. Sleep study.  PAP devices often need to be adjusted during a sleep study to show that they are effective and adjusted right.  2. Good tips to remember: Try wearing just the mask during a quiet time during the day so your body adapts to wearing it. A humidifier is recommended for comfort in most cases to prevent drying of your nose and throat. Allergy medication from your provider may help you if you are having nasal congestion.  3. Getting settled-in. It takes more than one night for most of us to get used to wearing a mask. Try wearing just the mask during a quiet time during the day so your body adapts to wearing it. A humidifier is recommended for comfort in most cases. Our team will work with you carefully on the first day and will be in contact within 4 days and again at 2 and 4 weeks for advice and remote device adjustments. Your therapy is evaluated by the device  each day.   4. Use it every night. The more you are able to sleep naturally for 7-8 hours, the more likely you will have good sleep and to prevent health risks or symptoms from sleep apnea. Even if you use it 4 hours it helps. Occasionally all of us are unable to use a medical therapy, in sleep apnea, it is not dangerous to miss one night.   5. Communicate. Call our skilled team on the number provided on the first day if your visit for problems that make it difficult to wear the device. Over 2 out of 3 patients can learn to wear the device long-term with help from our team. Remember to call our team or your sleep providers if you are unable to wear the device as we may have other solutions for those who cannot adapt to mask CPAP therapy. It is recommended that you sleep your sleep provider within the first 3 months and yearly after that if you are not having problems.   6. Use it for your health. We encourage use of CPAP masks during daytime quiet periods to allow your face and brain to adapt to the sensation of CPAP so that it will be a more natural sensation to awaken to at night or during naps. This can be very useful during the first few weeks or months of adapting to CPAP though it does not help medically to wear CPAP during wakefulness and  should not be used as a strategy just to meet guidelines.  7. Take care of your equipment. Make sure you clean your mask and tubing using directions every day and that your filter and mask are replaced as recommended or if they are not working.     Besides CPAP, what other therapies are there?    Oral Appliance  What is oral appliance therapy? An oral appliance device fits on your teeth at night like a retainer used after having braces. The device is made by a specialized dentist and requires several visits over 1-2 months before a manufactured device is made to fit your teeth and is adjusted to prevent your sleep apnea. Once an oral device is working properly, snoring  should be improved. A home sleep test may be recommended at that time if to determine whether the sleep apnea is adequately treated.    Some things to remember:  -Oral devices are often, but not always, covered by your medical insurance. Be sure to check with your insurance provider.   -If you are referred for oral therapy, you will be given a list of specialized dentists to consider, or you may choose to visit the website of the American Academy of Dental Sleep Medicine  -Oral devices are less likely to work if you have severe sleep apnea or are extremely overweight.     Positioning Device  Positioning devices are generally used when sleep apnea is mild and only occurs on your back.This example shows a pillow that straps around the waist. It may be appropriate for those whose sleep study shows milder sleep apnea that occurs primarily when lying flat on one's back. Preliminary studies have shown benefit but effectiveness at home may need to be verified by a home sleep test. These devices are generally not covered by medical insurance.    Devices that maintain sleeping on the back to prevent snoring and mild sleep apnea:    Belt type body positioner:  http://[employer]/    Electronic reminder:  Http://nightshifttherapy.com/  Http://www.[employer].au/  More detailed information  An oral appliance is a small acrylic device that fits over the upper and lower teeth  (similar to a retainer or a mouth guard). This device slightly moves jaw forward, which moves the base of the tongue forward, opens the airway, improves breathing for effective treat snoring and obstructive sleep apnea in perhaps 7 out of 10 people .  The best working devices are custom-made by a dental device  after a mold is made of the teeth 1, 2, 3.  When is an oral appliance indicated?  Oral appliance therapy is recommended as a first-line treatment for patients with primary snoring, mild sleep apnea, and for patients with moderate sleep  apnea who prefer appliance therapy to use of CPAP4, 5. Severity of sleep apnea is determined by sleep testing and is based on the number of respiratory events per hour of sleep.   How successful is oral appliance therapy?  The success rate of oral appliance therapy in patients with mild sleep apnea is 75-80% while in patients with moderate sleep apnea it is 50-70%. The chance of success in patients with severe sleep apnea is 40-50%. The research also shows that oral appliances have a beneficial effect on the cardiovascular health of RILEY patients at the same magnitude as CPAP therapy7.  Oral appliances should be a second-line treatment in cases of severe sleep apnea, but if not completely successful then a combination therapy utilizing CPAP plus oral appliance therapy may be effective. Oral appliances tend to be effective in a broad range of patients although studies show that the patients who have the highest success are females, younger patients, those with milder disease, and less severe obesity. 3, 6.   Finding a dentist that practices dental sleep medicine  Specific training is available through the American Academy of Dental Sleep Medicine for dentists interested in working in the field of sleep. To find a dentist who is educated in the field of sleep and the use of oral appliances, near you, visit the Web site of the American Academy of Dental Sleep Medicine.  References  1. Deena, et al. Objectively measured vs self-reported compliance during oral appliance therapy for sleep-disordered breathing. Chest 2013; 144(5): 7259-8669.  2. Ana María, et al. Objective measurement of compliance during oral appliance therapy for sleep-disordered breathing. Thorax 2013; 68(1): 91-96.  3. Camacho et al. Mandibular advancement devices in 620 men and women with RILEY and snoring: tolerability and predictors of treatment success. Chest 2004; 125: 4404-0812.  4. Alban[place] et al. Oral appliances for snoring and RILEY: a  "review. Sleep 2006; 29: 244-262.  5. Erasto et al. Oral appliance treatment for RILEY: an update. J Clin Sleep Med 2014; 10(2): 215-227.  6. Krista et al. Predictors of OSAH treatment outcome. J Dent Res 2007; 86: 7257-5903.    Weight Loss:  Weight loss is a long-term strategy that may improve sleep apnea in some patients.  Weight management is a personal decision and the decision should be based on your interest and the potential benefits.  If you are interested in exploring weight loss strategies, the following discussion covers the impact on weight loss on sleep apnea and the approaches that may be successful.  Being overweight does not necessarily mean you will have health consequences.  Those who have BMI over 35 or over 27 with existing medical conditions carries greater risk.   Weight loss decreases severity of sleep apnea in most people with obesity. For those with mild obesity who have developed snoring with weight gain, even 15-30 pound weight loss can improve and occasionally eliminate sleep apnea.  Structured and life-long dietary and health habits are necessary to lose weight and keep healthier weight levels.   Though there may be significant health benefits from weight loss, long-term weight loss is very difficult to achieve- studies show success with dietary management in less than 10% of people. In addition, substantial weight loss may require years of dietary control and may be difficult if patients have severe obesity. In these cases, surgical management may be considered.  Finally, older individuals who have tolerated obesity without health complications may be less likely to benefit from weight loss strategies.    SLEEP HYGIENE  - do not use electronic devices with bright screens (phones, TVs, computers) within 1 hour of bedtime; if possible, use a \"night mode\" on your devices  - do not exercise within 6 hours before bedtime  - create a good sleep environment: do not eat or watch tv in bed, " make your bedroom as dark and quiet as possible  - keep bed partners consistent and as few as possible; try to avoid children and pets sleeping in the bed with you  - do not to eat a large meal before bed; a light snack is ok  - no caffeine within 6 hours of bedtime (coffee, tea, soda, energy drinks, chocolate, some pain relievers)  - no more than 3 caffeinated beverages per day  - no alcohol within 4-6 hours of bedtime  - no nicotine before bedtime    Resources:  Https://aasm.org/resources/pdf/products/howtosleepbetter_web.pdf    Drive Safe... Drive Alive     Sleep health profoundly affects your health, mood, and your safety. 33% of the population (one in three of us) is not getting enough sleep and many have a sleep disorder. Not getting enough sleep or having an untreated / undertreated sleep condition may make us sleepy without even knowing it. In fact, our driving could be dramatically impaired due to our sleep health. As your provider, here are some things I would like you to know about driving:     Here are some warning signs for impairment and dangerous drowsy driving:              -Having been awake more than 16 hours              -Looking tired              -Eyelid drooping              -Head nodding (it could be too late at this point)              -Driving for more than 30 minutes     Some things you could do to make the driving safer if you are experiencing some drowsiness:              -Stop driving and rest              -Call for transportation              -Make sure your sleep disorder is adequately treated    Some things that have been shown NOT to work when experiencing drowsiness while driving:              -Turning on the radio              -Opening windows              -Eating any  distracting  /  entertaining  foods (e.g., sunflower seeds, candy, or any other)              -Talking on the phone    Your decision may not only impact your life, but also the life of others. Please, remember to  drive safe for yourself and all of us.

## 2020-10-28 NOTE — PROGRESS NOTES
"Andreia Jorgensen is a 60 year old female who is being evaluated via a billable video visit.      The patient has been notified of following:     \"This video visit will be conducted via a call between you and your physician/provider. We have found that certain health care needs can be provided without the need for an in-person physical exam.  This service lets us provide the care you need with a video conversation.  If a prescription is necessary we can send it directly to your pharmacy.  If lab work is needed we can place an order for that and you can then stop by our lab to have the test done at a later time.    Video visits are billed at different rates depending on your insurance coverage.  Please reach out to your insurance provider with any questions.    If during the course of the call the physician/provider feels a video visit is not appropriate, you will not be charged for this service.\"    Patient has given verbal consent for Video visit? Yes  How would you like to obtain your AVS? MyChart  If you are dropped from the video visit, the video invite should be resent to: Send to e-mail at: [email or web address]  Will anyone else be joining your video visit? No        Video-Visit Details    Type of service:  Video Visit    Video Start Time: 302PM  Video End Time: 402PM    Originating Location (pt. Location): Home    Distant Location (provider location):  [facility]     Platform used for Video Visit: Sam Urbina MD  "

## 2020-10-29 ENCOUNTER — TELEPHONE (OUTPATIENT)
Dept: SLEEP MEDICINE | Facility: CLINIC | Age: 60
End: 2020-10-29

## 2020-10-29 NOTE — TELEPHONE ENCOUNTER
Left a message for patient to contact sleep clinic to schedule sleep study and follow up with Dr. Urbina.  Jayna Lowe MA

## 2020-11-16 ENCOUNTER — HEALTH MAINTENANCE LETTER (OUTPATIENT)
Age: 60
End: 2020-11-16

## 2021-02-18 ENCOUNTER — COMMUNICATION - HEALTHEAST (OUTPATIENT)
Dept: FAMILY MEDICINE | Facility: CLINIC | Age: 61
End: 2021-02-18

## 2021-05-29 ENCOUNTER — RECORDS - HEALTHEAST (OUTPATIENT)
Dept: ADMINISTRATIVE | Facility: CLINIC | Age: 61
End: 2021-05-29

## 2021-05-29 NOTE — PROGRESS NOTES
FEMALE PREVENTATIVE EXAM    Assessment and Plan:       1. Routine general medical examination at a health care facility      2. Pain in both hands  Mainly with use.  No symptoms of carpal tunnel syndrome  - C-Reactive Protein; Future  - Sedimentation Rate; Future    3. Hip pain, left/4. Hip pain, right  Worse with inactivity; no pain with passive ROM  - C-Reactive Protein; Future  - Sedimentation Rate; Future  - XR Hip Left 2 or More VWS; Future - this looks normal to me - await radiologist reading  Consider PT if these are normal    5. Osteopenia of both hips  Due for:  - DXA Bone Density Scan; Future    6. Herpes simplex virus infection  monitoring for use of valtrex as needed  - HM1(CBC and Differential); Future  - Comprehensive Metabolic Panel; Future    7. Papanicolaou smear for cervical cancer screening  - Gynecologic Cytology (PAP Smear)    8. Visit for screening mammogram  - Mammo Screening Bilateral; Future    9. Special screening for malignant neoplasms, colon  - Ambulatory referral for Colonoscopy    10. Screening, lipid  - Lipid Cascade FASTING; Future        Next follow up:  Return in about 1 year (around 5/21/2020) for Annual physical, or earlier as needed.    Immunization Review  Adult Imm Review: No immunizations due today    I have had an Advance Directives discussion with the patient.    Subjective:   Chief Complaint: Andreia Rivas is an 59 y.o. female here for a preventative health visit.     HPI:      Andreia has a number of musculoskeletal concerns (but no concerns about tick bites)   - tenderness between fingers - on sides     - Wrist pain because of computer use.  No pain in fingers with movement   - both hips hurt - there is a level of stiffness  -acupuncture is helpful.  For 3 months it was waking her up at night -now not as bad, but  pain manifest with being sedentary   -  lumbar pain - hurts with sitting - does not go down legs - started 6-7 years ago carrying hutch upstairs  - ryan  rigidis   - .  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   - no need for STI check      Healthy Habits  Are you taking a daily aspirin? No  Do you typically exercising at least 40 min, 3-4 times per week?  NO - TRX class, does not do cardio - will try Kinza  Do you usually eat at least 4 servings of fruit and vegetables a day, include whole grains and fiber and avoid regularly eating high fat foods? Yes  Have you had an eye exam in the past two years? Yes  Do you see a dentist twice per year? Yes  Do you have any concerns regarding sleep? No    Safety Screen  If you own firearms, are they secured in a locked gun cabinet or with trigger locks? The patient does not own any firearms  Do you feel you are safe where you are living?: Yes (5/21/2019  4:25 PM)  Do you feel you are safe in your relationship(s)?: Yes (5/21/2019  4:25 PM)      Review of Systems:    Constitutional: negative for recent illness or change in weight  Eyes: negative for irritation and vision change  Ears, nose, mouth, throat, and face: negative for nasal congestion and sore throat  Respiratory: negative for cough and dyspnea on exertion  Cardiovascular: negative for chest pain and palpitations  Gastrointestinal: negative for abdominal pain and change in bowel habits  Genitourinary:negative for dysuria, frequency and hesitancy  Integument/breast: negative for rash  Hematologic/lymphatic: negative for bleeding and easy bruising  Musculoskeletal:negative for arthralgias and myalgias  Neurological: negative for dizziness, headaches and paresthesia        Cancer Screening       Status Date      PAP SMEAR Overdue 1/31/2019      Done 1/31/2014 GYNECOLOGIC CYTOLOGY (PAP SMEAR)     Patient has more history with this topic...    MAMMOGRAM Overdue 5/25/2019      Done 5/25/2018 MAMMO SCREENING BILATERAL     Patient has more  "history with this topic...    COLONOSCOPY Next Due 11/3/2019      Done 11/3/2014 ENDOSCOPY, COLON     Patient has more history with this topic...              History     Reviewed By Date/Time Sections Reviewed    Sharon Lang LPN 5/21/2019  4:27 PM Tobacco, Alcohol, Drug Use, Sexual Activity            Objective:   Vital Signs:   Visit Vitals  /68 (Patient Site: Left Arm, Patient Position: Sitting, Cuff Size: Adult Regular)   Pulse 72   Temp 98.1  F (36.7  C) (Oral)   Ht 5' 4.76\" (1.645 m)   Wt 164 lb 12 oz (74.7 kg)   SpO2 98%   BMI 27.62 kg/m           PHYSICAL EXAM  General appearance - alert, well appearing, and in no distress  Mental status - normal mood, behavior, speech, dress, motor activity, and thought processes  Eyes - funduscopic exam normal, discs flat and sharp  Ears - bilateral TM's and external ear canals normal  Mouth - mucous membranes moist, pharynx normal without lesions  Neck - supple, no significant adenopathy, carotids upstroke normal bilaterally, no bruits, thyroid exam: thyroid is normal in size without nodules or tenderness  Chest - clear to auscultation, no wheezes, rales or rhonchi, symmetric air entry  Heart - normal rate, regular rhythm, normal S1, S2, no murmurs, rubs, clicks or gallops  Abdomen - soft, nontender, nondistended, no masses or organomegaly  Breasts - breasts appear normal, no suspicious masses, no skin or nipple changes or axillary nodes  Pelvic - VULVA: normal appearing vulva with no masses, tenderness or lesions, VAGINA: normal appearing vagina with normal color and discharge, no lesions, CERVIX: normal appearing cervix without discharge or lesions  Neurological - alert, oriented, normal speech, no focal findings or movement disorder noted, DTR's normal and symmetric  MS - Phalen's and Tinel's tests negative. No pain with resisted finger ab/adduction. Passive hip ROM without pain.  No pain over greater trochanteric bursae  Extremities - peripheral pulses " normal, no pedal edema, no clubbing or cyanosis  Skin - no rashes or worrisome lesions    The 10-year ASCVD risk score (Edgar IRVING Jr., et al., 2013) is: 3.1%    Values used to calculate the score:      Age: 59 years      Sex: Female      Is Non- : No      Diabetic: No      Tobacco smoker: No      Systolic Blood Pressure: 116 mmHg      Is BP treated: No      HDL Cholesterol: 58 mg/dL      Total Cholesterol: 244 mg/dL

## 2021-05-29 NOTE — PROGRESS NOTES
Addendum: I had neglected to label Andreia's first pap smear and it was discarded.  She was coming in to the lab today for a blood draw and was agreeable to doing a pap smear today, so I this just 10 minutes ago

## 2021-05-30 ENCOUNTER — RECORDS - HEALTHEAST (OUTPATIENT)
Dept: ADMINISTRATIVE | Facility: CLINIC | Age: 61
End: 2021-05-30

## 2021-05-31 VITALS — BODY MASS INDEX: 26.96 KG/M2 | WEIGHT: 162 LBS

## 2021-06-01 VITALS — WEIGHT: 164 LBS | BODY MASS INDEX: 27.32 KG/M2 | HEIGHT: 65 IN

## 2021-06-03 VITALS — BODY MASS INDEX: 27.45 KG/M2 | WEIGHT: 164.75 LBS | HEIGHT: 65 IN

## 2021-06-05 VITALS
DIASTOLIC BLOOD PRESSURE: 70 MMHG | OXYGEN SATURATION: 98 % | HEIGHT: 65 IN | WEIGHT: 164 LBS | BODY MASS INDEX: 27.32 KG/M2 | SYSTOLIC BLOOD PRESSURE: 112 MMHG | HEART RATE: 75 BPM

## 2021-06-08 NOTE — TELEPHONE ENCOUNTER
Please call her: we can have a virtual follow up visit before September and a physical September or later.  However due to changes to July/August schedules and the necessity of rescheduling patients who were already scheduled for F2F visit, we will have to put her on a list of people to call until we work this out.     Staff:  Please put patient on list of patients who we call to schedule for follow up AFTER sorting out July schedule

## 2021-06-08 NOTE — TELEPHONE ENCOUNTER
She can do a telephone or video visit before September .  If we can get her in for that later this week or next, then would not have to pur janes a list and she can call early August to schedule an annual in September

## 2021-06-08 NOTE — TELEPHONE ENCOUNTER
New Appointment Needed  What is the reason for the visit:    Patient would like to schedule her physical with Dr. Alvarez. She does not have anything until September in office and patient is wondering if she can do a physical with a telephone or video before September and also discuss results of a sleep study at same time. Or, would she have to come into the clinic in September? Please advise and help assist in scheduling. Thank you!  Provider Preference: Kim only   How soon do you need to be seen?: ASAP  Waitlist offered?: No  Okay to leave a detailed message:  Yes

## 2021-06-08 NOTE — TELEPHONE ENCOUNTER
Left message to call back for: Patient  Information to relay to patient:    Per Dr. Alvarez-  we can have a virtual follow up visit before September and a physical September or later.  However due to changes to July/August schedules and the necessity of rescheduling patients who were already scheduled for F2F visit, we will have to put her on a list of people to call until we work this out.      Staff:  Please put patient on list of patients who we call to schedule for follow up AFTER sorting out July schedule.      SALLY/CHAUNCEY

## 2021-06-11 NOTE — PROGRESS NOTES
"FEMALE PREVENTATIVE EXAM    Assessment and Plan:       1. Routine general medical examination at a health care facility  Generally well woman    2. Poor sleep  Volunteer study by [facility] shows mild-moderate sleep apnea  - Ambulatory referral to Sleep Medicine    3. Screening for cervical cancer  - Gynecologic Cytology (PAP Smear)    4. Screening, lipid  - Lipid [place] FASTING    5. Need for hepatitis C screening test  - Hepatitis C Antibody (Anti-HCV)        Next follow up:  Return in about 1 year (around 9/4/2021) for Annual physical, or earlier as needed.    Immunization Review  Adult Imm Review: will get flu vaccine through pharmacy    I discussed the following with the patient:   Adult Healthy Living: Importance of regular exercise  Helmets  sleep hygeine    I have had an Advance Directives discussion with the patient.   Told how to upload to [email or web address]    Subjective:   Chief Complaint: Andreia Rivas is an 60 y.o. female here for a preventative health visit.     HPI:    How are you doing during this  CoVid19 pandemic?   Works on a e-commerce web site. When they started working at home - her roles/responsibilties were greatly expanded and she started working a lot more. Did really well through being closed - everyone was only purchasing on line. Promotions every weekend meant she was  doing a lot of middle of the night work.    Sleep concerns: The extra night work highlighted the troubles she was already having. She has been not feeling as well rested as she would like to even before pandemic - wakes up 3-4  Times a night. She does have allergies, but not bad this year. In the winter gets post nasal drip - runnier than in the summer. She has not noticed a seasonal pattern to the way she sleeps - sleeps better alone as her  snidalia    Volunteered to do a sleep study for [facility] - she brings a copy of the study which shows mild to moderate sleep apnea    Tightness in central chest -\" might " "be associated with computer thing :  It's in here;\" she points to lower central chest. Fees like it's underneath bra and breast line might be extra thickness going on - sits at desk 8 hours a day.  There was a sharp pain 10-12 years ago doing sit ups.  Since then it has come and gone when she is in strange position all day, or doing many sit ups. Dos not feel like heartburn.  No chest pain with exertion      Healthy Habits  Are you taking a daily aspirin? Yes  Do you typically exercising at least 40 min, 3-4 times per week?  NO  - has gotten back into it ; got a new bike - more cycling - stirs up hip pain;  [facility] opened   Do you usually eat at least 4 servings of fruit and vegetables a day, include whole grains and fiber and avoid regularly eating high fat foods? Yes  Have you had an eye exam in the past two years? Yes  Do you see a dentist twice per year? Yes  Do you have any concerns regarding sleep? YES    Safety Screen  If you own firearms, are they secured in a locked gun cabinet or with trigger locks? The patient does not own any firearms  Do you feel you are safe where you are living?: Yes (9/4/2020  8:10 AM)  Do you feel you are safe in your relationship(s)?: Yes (9/4/2020  8:10 AM)      Review of Systems:    Constitutional: negative for recent illness or change in weight; sleep difficulties notes above  Eyes: negative for irritation and vision change  Ears, nose, mouth, throat, and face: negative for nasal congestion and sore throat  Respiratory: negative for cough and dyspnea on exertion  Cardiovascular: negative for chest pain and palpitations  Gastrointestinal: negative for abdominal pain and change in bowel habits  Genitourinary:negative for dysuria, frequency and hesitancy  Integument/breast: negative for rash  Hematologic/lymphatic: negative for bleeding and easy bruising  Musculoskeletal:negative for arthralgias and myalgias  Neurological: negative for dizziness, headaches and paresthesia    Cancer " "Screening       Status Date      PAP SMEAR Overdue 5/24/2020      Done 5/24/2019 GYNECOLOGIC CYTOLOGY (PAP SMEAR)     Patient has more history with this topic...    MAMMOGRAM Next Due 8/27/2021      Done 8/27/2020 MAMMO SCREENING BILATERAL     Patient has more history with this topic...              History     Not marked as reviewed during this visit.            Objective:   Vital Signs:   Visit Vitals  /70   Pulse 75   Ht 5' 5\" (1.651 m)   Wt 164 lb (74.4 kg)   SpO2 98%   BMI 27.29 kg/m           PHYSICAL EXAM  General appearance - alert, well appearing, and in no distress  Mental status - normal mood, behavior, speech, dress, motor activity, and thought processes  Eyes - pupils equal and reactive, extraocular eye movements intact, funduscopic exam normal, discs flat and sharp  Ears - bilateral TM's and external ear canals normal  Mouth - mucous membranes moist, pharynx normal without lesions  Neck - supple, no significant adenopathy, carotids upstroke normal bilaterally, no bruits, thyroid exam: thyroid is normal in size without nodules or tenderness  Chest - clear to auscultation, no wheezes, rales or rhonchi, symmetric air entry  Heart - normal rate, regular rhythm, normal S1, S2, no murmurs, rubs, clicks or gallops  Abdomen - soft, nontender, nondistended, no masses or organomegaly  Breasts - breasts appear normal, no suspicious masses, no skin or nipple changes or axillary nodes  Pelvic exam: VULVA: normal appearing vulva with no masses, tenderness or lesions, VAGINA: normal appearing vagina with normal color and discharge, no lesions,  CERVIX: normal appearing cervix without discharge or lesions, sclerotic os,.  Neurological - alert, oriented, normal speech, no focal findings or movement disorder noted, DTR's normal and symmetric  Extremities - peripheral pulses normal, no pedal edema, no clubbing or cyanosis  Skin - no rashes or worrisome lesions      "

## 2021-06-15 NOTE — PROGRESS NOTES
"Assessment and Plan    1. Influenza-like illness  Discussed too late at this point to give Tamiflu, and also by her report symptoms are improving. However she should nonetheless stay away from work until she is feeling better    Saumya Alvarez MD     -------------------------------------------    Chief Complaint   Patient presents with     Cough     fevers, chill, achey, no appetite, nausea, rib hurts.  Going on since Monday.  Fever broke this morning-this morning was low grade fever in morning.        Andreia's symptoms came on very suddenly.  Monday (3 days ago) she woke up with a little cough and by the evening she is hacking and went to be early.  Fever came on Tuesday - raised on Wednesday - fever broke this am.  Cough is not as frequent.  There was never shortness of breath.  There is still tightness in her chest and the \"antoni truck syndrome.\"        Patient Active Problem List   Diagnosis     Joint Pain, Localized In The Knee     Osteopenia     Hypercholesterolemia     Coccydynia       Current Outpatient Prescriptions on File Prior to Visit   Medication Sig Dispense Refill     ascorbic acid, vitamin C, (VITAMIN C) 1000 MG tablet Take 1,000 mg by mouth.       calcium-mag-vit B6-D3-minerals (CALCIUM CITRATE PLUS, VIT B6,) [id number] wv-gy-eg-unit Tab Take by mouth.       cholecalciferol, vitamin D3, 5,000 unit Tab Take by mouth.       NON FORMULARY \"Natural Calm\"; magnesium supplement drink       NON FORMULARY Arg Sulph LM 6; Take 7 drops in 4 oz of water       estradiol (ESTRING) 2 mg vaginal ring Insert 1 each into the vagina.       valACYclovir (VALTREX) 1000 MG tablet Take 2,000 mg by mouth.       valACYclovir (VALTREX) 500 MG tablet Take 500 mg by mouth.       No current facility-administered medications on file prior to visit.          History   Smoking Status     Former Smoker   Smokeless Tobacco     Not on file       History   Alcohol use Not on file       Vitals:    01/04/18 1126   BP: 98/74   Pulse: 72 "   Resp: 20   Temp: 98.4  F (36.9  C)   SpO2: 98%     There is no height or weight on file to calculate BMI.     EXAM:        General appearance - alert, well appearing, and in no distress, dry cough  Mental status - normal mood, behavior, speech, dress, motor activity, and thought processes  Eyes - conjunctiva clear  Ears - TMs and canals normal bilaterally   Mouth - mucous membranes moist, pharynx normal without lesions and mild posterior erythema  Neck - supple, no significant adenopathy  Chest - clear to auscultation, no wheezes, rales or rhonchi, symmetric air entry  Heart - normal rate, regular rhythm, normal S1, S2, no murmurs, rubs, clicks or gallops

## 2021-06-18 NOTE — PROGRESS NOTES
"FEMALE PREVENTATIVE EXAM    Assessment and Plan:       1. Routine general medical examination at a health care facility    2. Toe pain, right  This sounds like possible arthritis  - advised rigid soles.  She does not have any known risk factors for gout (alcohol use, diuretic use, high purine diet) but as a precaution will check\"  - Uric Acid    3. Weight gain - gradual over the years  - Thyroid Stimulating Hormone (TSH)  - T4, Free    4. Osteoarthritis of knees, bilateral  Trial of  - diclofenac sodium (VOLTAREN) 1 % Gel; Apply 4 grams to affected knees up to qid  Dispense: 100 g; Refill: 1   - may also try Over-the-counter glucosamine    5. Hypercholesterolemia  - Lipid [place] FASTING    6. Herpes  - valACYclovir (VALTREX) 500 MG tablet; Take 1 tablet (500 mg total) by mouth daily.  Dispense: 90 tablet; Refill: 1    7. Visit for screening mammogram  - Mammo Screening Bilateral; Future    Patient Instructions   Check insurance to see if Shingrix is covered              Next follow up:  No Follow-up on file.    Immunization Review  Adult Imm Review: No immunizations due today    I discussed the following with the patient:   Adult Healthy Living: Importance of regular exercise  Healthy nutrition  Getting adequate sleep  Supplement use   If doing breast self exam  - NOT \"looking for lumps\" but getting to know what is your normal and being aware of changes      I have had an Advance Directives discussion with the patient.    Subjective:   Chief Complaint: Andreia CARRASQUILLO Rob is an 58 y.o. female here for a preventative health visit.     HPI: Concerns:     Arthritis in left knee  - it's always there - exacerbated with exercise.  Andreia has been  doing \"warrior sculpt \"- yoga with weights - to strengthen muscles around knee - doing more exercise the last few months - feeling better- still gaining weight.  Sleeps better, clothes fit better.  Had an xray showing arthritis.  Seen at [facility]. Will try " glucosamine    Pain in MTP big toe on right - wonders if she injured - she had burning for several months last fall into January and February.  Gets warm - no redness, no swelling.  Has a mild bunion    Ravi horses, has tried magnesium - gets them periodically/unpredictably.  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   - no need for STI check      Healthy Habits  Are you taking a daily aspirin? No  Do you typically exercising at least 40 min, 3-4 times per week?  NO exercises 2-3 times weekly  Do you usually eat at least 4 servings of fruit and vegetables a day, include whole grains and fiber and avoid regularly eating high fat foods? NO  Have you had an eye exam in the past two years? Yes  Do you see a dentist twice per year? Yes  Do you have any concerns regarding sleep? YES-sometimes wakes up in the middle of the night, ongoing for several years.  Better after exercising for sleep.    Safety Screen  If you own firearms, are they secured in a locked gun cabinet or with trigger locks? The patient does not own any firearms  Do you feel you are safe where you are living?: Yes (5/17/2018  8:24 AM)  Do you feel you are safe in your relationship(s)?: Yes (5/17/2018  8:24 AM)    Review of Systems:   Constitutional: negative for recent illness; gradual weight over years  Eyes: negative for irritation and vision change  Ears, nose, mouth, throat, and face: negative for nasal congestion and sore throat  Respiratory: negative for cough and dyspnea on exertion  Cardiovascular: negative for chest pain and palpitations  Gastrointestinal: negative for abdominal pain and change in bowel habits  Genitourinary:negative for dysuria, frequency and hesitancy  Integument/breast: negative for rash  Hematologic/lymphatic: negative for bleeding and easy bruising  Musculoskeletal:negative for  "arthralgias and myalgias except as noted in HPI  Neurological: negative for dizziness, headaches and paresthesia        Cancer Screening       Status Date      MAMMOGRAM Overdue 1/9/2018      Done 1/9/2017 MAMMO SCREENING BILATERAL     Patient has more history with this topic...    PAP SMEAR Next Due 1/31/2019      Done 1/31/2014 GYNECOLOGIC CYTOLOGY (PAP SMEAR)     Patient has more history with this topic...    COLONOSCOPY Next Due 11/3/2019      Done 11/3/2014 ENDOSCOPY, COLON     Patient has more history with this topic...              History     Not marked as reviewed during this visit.            Objective:   Vital Signs:   Visit Vitals     /82 (Patient Site: Right Arm, Patient Position: Sitting, Cuff Size: Adult Regular)     Pulse 62     Resp 16     Ht 5' 5\" (1.651 m)     Wt 164 lb (74.4 kg)     SpO2 98%     BMI 27.29 kg/m2          PHYSICAL EXAM  General appearance - alert, well appearing, and in no distress and overweight  Mental status - normal mood, behavior, speech, dress, motor activity, and thought processes  Eyes - pupils equal and reactive, extraocular eye movements intact, funduscopic exam normal, discs flat and sharp  Ears - bilateral TM's and external ear canals normal  Mouth - mucous membranes moist, pharynx normal without lesions  Neck - supple, no significant adenopathy, carotids upstroke normal bilaterally, no bruits, thyroid exam: thyroid is normal in size without nodules or tenderness  Chest - clear to auscultation, no wheezes, rales or rhonchi, symmetric air entry  Heart - normal rate, regular rhythm, normal S1, S2, no murmurs, rubs, clicks or gallops  Abdomen - soft, nontender, nondistended, no masses or organomegaly  Breasts - breasts appear normal, no suspicious masses, no skin or nipple changes or axillary nodes  Neurological - DTR's normal and symmetric  Extremities - Left MTP looks normal; no edema  Skin - Scattered normal benign looking pale brons nevi, a few SK    The 10-year " ASCVD risk score (Edgar IRVING Jr, et al., 2013) is: 3%    Values used to calculate the score:      Age: 58 years      Sex: Female      Is Non- : No      Diabetic: No      Tobacco smoker: No      Systolic Blood Pressure: 116 mmHg      Is BP treated: No      HDL Cholesterol: 59 mg/dL      Total Cholesterol: 268 mg/dL

## 2021-06-22 NOTE — TELEPHONE ENCOUNTER
New Appointment Needed  What is the reason for the visit:    Nurse Visit for 2nd Shingles Shot  Provider Preference: [facility]  How soon do you need to be seen?: Soonest available  Waitlist offered?: No  Okay to leave a detailed message:  Yes    Patient would like to be informed if [facility] has any shingles shots

## 2021-09-02 ENCOUNTER — ANCILLARY PROCEDURE (OUTPATIENT)
Dept: MAMMOGRAPHY | Facility: CLINIC | Age: 61
End: 2021-09-02
Attending: FAMILY MEDICINE
Payer: COMMERCIAL

## 2021-09-02 DIAGNOSIS — Z12.31 VISIT FOR SCREENING MAMMOGRAM: ICD-10-CM

## 2021-09-02 PROCEDURE — 77067 SCR MAMMO BI INCL CAD: CPT | Mod: TC | Performed by: INTERNAL MEDICINE

## 2021-09-02 PROCEDURE — 77063 BREAST TOMOSYNTHESIS BI: CPT | Mod: TC | Performed by: INTERNAL MEDICINE

## 2021-09-15 ENCOUNTER — OFFICE VISIT (OUTPATIENT)
Dept: FAMILY MEDICINE | Facility: CLINIC | Age: 61
End: 2021-09-15
Payer: COMMERCIAL

## 2021-09-15 VITALS
BODY MASS INDEX: 24.94 KG/M2 | TEMPERATURE: 97.9 F | OXYGEN SATURATION: 98 % | RESPIRATION RATE: 14 BRPM | DIASTOLIC BLOOD PRESSURE: 77 MMHG | SYSTOLIC BLOOD PRESSURE: 117 MMHG | HEART RATE: 64 BPM | WEIGHT: 164 LBS

## 2021-09-15 DIAGNOSIS — M25.531 PAIN IN BOTH WRISTS: ICD-10-CM

## 2021-09-15 DIAGNOSIS — Z72.820 POOR SLEEP: ICD-10-CM

## 2021-09-15 DIAGNOSIS — M25.532 PAIN IN BOTH WRISTS: ICD-10-CM

## 2021-09-15 DIAGNOSIS — M85.88 OSTEOPENIA OF SPINE: ICD-10-CM

## 2021-09-15 DIAGNOSIS — E78.00 HYPERCHOLESTEROLEMIA: ICD-10-CM

## 2021-09-15 DIAGNOSIS — M25.562 LEFT KNEE PAIN, UNSPECIFIED CHRONICITY: ICD-10-CM

## 2021-09-15 DIAGNOSIS — Z13.1 SCREENING FOR DIABETES MELLITUS: ICD-10-CM

## 2021-09-15 DIAGNOSIS — Z00.00 ROUTINE GENERAL MEDICAL EXAMINATION AT A HEALTH CARE FACILITY: Primary | ICD-10-CM

## 2021-09-15 DIAGNOSIS — R06.83 SNORING: ICD-10-CM

## 2021-09-15 DIAGNOSIS — M25.542 ARTHRALGIA OF BOTH HANDS: ICD-10-CM

## 2021-09-15 DIAGNOSIS — M25.541 ARTHRALGIA OF BOTH HANDS: ICD-10-CM

## 2021-09-15 PROCEDURE — 90682 RIV4 VACC RECOMBINANT DNA IM: CPT | Performed by: FAMILY MEDICINE

## 2021-09-15 PROCEDURE — 99396 PREV VISIT EST AGE 40-64: CPT | Mod: 25 | Performed by: FAMILY MEDICINE

## 2021-09-15 PROCEDURE — 90471 IMMUNIZATION ADMIN: CPT | Performed by: FAMILY MEDICINE

## 2021-09-15 ASSESSMENT — ENCOUNTER SYMPTOMS
NERVOUS/ANXIOUS: 0
PALPITATIONS: 0
JOINT SWELLING: 1
NAUSEA: 0
HEADACHES: 0
SHORTNESS OF BREATH: 0
HEMATURIA: 0
FREQUENCY: 0
DIZZINESS: 0
SORE THROAT: 0
PARESTHESIAS: 0
MYALGIAS: 1
DYSURIA: 0
CHILLS: 0
HEARTBURN: 0
WEAKNESS: 0
COUGH: 0
BREAST MASS: 0
CONSTIPATION: 0
EYE PAIN: 0
ARTHRALGIAS: 1
HEMATOCHEZIA: 0
ABDOMINAL PAIN: 0
FEVER: 0
DIARRHEA: 0

## 2021-09-15 ASSESSMENT — PATIENT HEALTH QUESTIONNAIRE - PHQ9
10. IF YOU CHECKED OFF ANY PROBLEMS, HOW DIFFICULT HAVE THESE PROBLEMS MADE IT FOR YOU TO DO YOUR WORK, TAKE CARE OF THINGS AT HOME, OR GET ALONG WITH OTHER PEOPLE: NOT DIFFICULT AT ALL
SUM OF ALL RESPONSES TO PHQ QUESTIONS 1-9: 3
SUM OF ALL RESPONSES TO PHQ QUESTIONS 1-9: 3

## 2021-09-15 NOTE — PATIENT INSTRUCTIONS
Preventive Health Recommendations  Female Ages 50 - 64    Yearly exam: See your health care provider every year in order to  o Review health changes.   o Discuss preventive care.    o Review your medicines if your doctor has prescribed any.      Get a Pap test every three years (unless you have an abnormal result and your provider advises testing more often).    If you get Pap tests with HPV test, you only need to test every 5 years, unless you have an abnormal result.     You do not need a Pap test if your uterus was removed (hysterectomy) and you have not had cancer.    You should be tested each year for STDs (sexually transmitted diseases) if you're at risk.     Have a mammogram every 1 to 2 years.    Have a colonoscopy at age 50, or have a yearly FIT test (stool test). These exams screen for colon cancer.      Have a cholesterol test every 5 years, or more often if advised.    Have a diabetes test (fasting glucose) every three years. If you are at risk for diabetes, you should have this test more often.     If you are at risk for osteoporosis (brittle bone disease), think about having a bone density scan (DEXA).    Shots: Get a flu shot each year. Get a tetanus shot every 10 years.    Nutrition:     Eat at least 5 servings of fruits and vegetables each day.    Eat whole-grain bread, whole-wheat pasta and brown rice instead of white grains and rice.    Get adequate Calcium and Vitamin D.     Lifestyle    Exercise at least 150 minutes a week (30 minutes a day, 5 days a week). This will help you control your weight and prevent disease.    Limit alcohol to one drink per day.    No smoking.     Wear sunscreen to prevent skin cancer.     See your dentist every six months for an exam and cleaning.    See your eye doctor every 1 to 2 years.    Patient Education     Treatment for Baker s Cyst (Popliteal Cyst)  A Baker s cyst (popliteal cyst) is a fluid-filled sac that forms behind the knee.  Types of treatment  You  likely won t need any treatment if you don t have any symptoms from your Baker s cyst. Some Baker s cysts go away without any treatment. If your cyst starts causing symptoms, you might need treatment at that time.  If you do have symptoms, you may be treated depending on the cause of your cyst. For example, you may need medicine for rheumatoid arthritis.  Other treatments for a Baker s cyst can include:    Over-the-counter pain medicines    Arthrocentesis, where a needle is used to remove extra fluid from the joint space    Steroid injection into the joint to reduce cyst size    Surgery to remove the cyst  Possible complications of a Baker s cyst  In rare cases, a Baker s cyst may cause complications. The cyst may get larger, which may cause redness and swelling. The cyst may also rupture, causing warmth, redness, and pain in your calf.  The symptoms may be the same as a blood clot in the veins of the legs. Your healthcare provider may need imaging tests of your leg to make sure you don t have a clot. Rupture can also lead to its own complications, such as:    Trapping of a tibial nerve. This causes calf pain and numbness behind the leg. It can be treated with arthrocentesis and steroid injections.    Blockage of the popliteal artery. This causes pain and lack of blood flow to the leg. It can be treated with arthrocentesis and steroid injections or surgery.    Compartment syndrome. This causes intense pain and problems moving the foot or toes. It is the result of pressure building in the muscles causing blood flow to decrease. Compartment syndrome is a medical emergency that requires immediate surgery. It can lead to permanent muscle damage if not treated right away.  When to call the healthcare provider  If your cyst starts causing mild symptoms, plan to see your healthcare provider soon. See him or her right away if you have symptoms such as redness and swelling of your leg, or numbness and discoloration of the  foot. These symptoms may mean your Baker s cyst has ruptured or causing other problems.  Donna last reviewed this educational content on 5/1/2018 2000-2021 [employer]. All rights reserved. This information is not intended as a substitute for professional medical care. Always follow your healthcare professional's instructions.

## 2021-09-15 NOTE — PROGRESS NOTES
SUBJECTIVE:   CC: Andreia Jorgensen is an 61 year old woman who presents for preventive health visit.        Patient has been advised of split billing requirements and indicates understanding: Yes       Healthy Habits:     Getting at least 3 servings of Calcium per day:  Yes    Bi-annual eye exam:  Yes    Dental care twice a year:  Yes    Sleep apnea or symptoms of sleep apnea:  None    Diet:  Regular (no restrictions)    Frequency of exercise:  2-3 days/week    Duration of exercise:  15-30 minutes    Taking medications regularly:  Not Applicable    Medication side effects:  Not applicable    PHQ-2 Total Score: 3        Today's PHQ-2 Score:   PHQ-2 ( 1999 [employer]) 9/15/2021   Q1: Little interest or pleasure in doing things 3   Q2: Feeling down, depressed or hopeless 0   PHQ-2 Score 3   Q1: Little interest or pleasure in doing things Nearly every day   Q2: Feeling down, depressed or hopeless Not at all   PHQ-2 Score 3       Abuse: Current or Past (Physical, Sexual or Emotional) - No  Do you feel safe in your environment? Yes        Social History     Tobacco Use     Smoking status: Never Smoker     Smokeless tobacco: Never Used   Substance Use Topics     Alcohol use: Yes     Alcohol/week: 0.0 standard drinks     Comment: occ     If you drink alcohol do you typically have >3 drinks per day or >7 drinks per week? No    Alcohol Use 9/15/2021   Prescreen: >3 drinks/day or >7 drinks/week? No   Prescreen: >3 drinks/day or >7 drinks/week? -   No flowsheet data found.    Reviewed orders with patient.  Reviewed health maintenance and updated orders accordingly - Yes       Breast Cancer Screening:    Breast CA Risk Assessment (FHS-7) 9/15/2021   Do you have a family history of breast, colon, or ovarian cancer? No / Unknown           Pertinent mammograms are reviewed under the imaging tab.    History of abnormal Pap smear: NO - age 30-65 PAP every 5 years with negative HPV co-testing recommended  PAP / HPV Latest Ref Rng &  Units 9/4/2020 5/24/2019 8/30/2017   PAP Negative for squamous intraepithelial lesion or malignancy. Negative for squamous intraepithelial lesion or malignancy  Electronically signed by Fabienne Barillas CT (ASCP) on 9/16/2020 at 11:33 AM   Unsatisfactory for evaluation  Electronically signed by Fabienne Barillas CT (ASCP) on 6/3/2019 at 10:55 AM   -   PAP (Historical) - - - NIL   HPV16 NEG Negative Negative Negative   HPV18 NEG Negative Negative Negative   HRHPV NEG Negative Negative Negative     Reviewed and updated as needed this visit by clinical staff  Tobacco  Allergies  Meds              Reviewed and updated as needed this visit by Provider                Past Medical History:   Diagnosis Date     Adenomatous polyps     colon     Dense breast      Low bone mass      Osteopenia      Vitamin D deficiency       Past Surgical History:   Procedure Laterality Date     COLONOSCOPY  11/03/2014    Colon normal; Repeat in 5 years     EYE SURGERY  spring 2017    PTK and refractive       Review of Systems   Constitutional: Negative for chills and fever.   HENT: Negative for congestion, ear pain, hearing loss and sore throat.    Eyes: Negative for pain and visual disturbance.   Respiratory: Negative for cough and shortness of breath.    Cardiovascular: Negative for chest pain, palpitations and peripheral edema.   Gastrointestinal: Negative for abdominal pain, constipation, diarrhea, heartburn, hematochezia and nausea.   Breasts:  Negative for tenderness, breast mass and discharge.   Genitourinary: Negative for dysuria, frequency, genital sores, hematuria, pelvic pain, urgency, vaginal bleeding and vaginal discharge.   Musculoskeletal: Positive for arthralgias, joint swelling and myalgias.   Skin: Negative for rash.   Neurological: Negative for dizziness, weakness, headaches and paresthesias.   Psychiatric/Behavioral: Negative for mood changes. The patient is not nervous/anxious.      Last year she was having  some pain in the knuckles of her fingers in both hands.  She did some lab testing with her previous PCP which did not appear to show evidence of significant inflammation.  She has had a similar experience recently with more pain.  Her hands felt hot.  She saw her homeopath and in their discussion she mentioned that she is allergic to tomatoes.  Her homeopath suggested stopping tomatoes for a week and then trying them again.  Her pain improved when she stopped the tomatoes and she will be trying to be exposed to titers again tomorrow.  If it does appear that this is linked to eating tomatoes, no need for further evaluation.  If she does have recurrent pain outside of eating tomatoes, I recommended following up with me in a consider repeat lab evaluation possible rheumatology referral     OBJECTIVE:   /77 (BP Location: Left arm, Patient Position: Sitting, Cuff Size: Adult Regular)   Pulse 64   Temp 97.9  F (36.6  C) (Tympanic)   Resp 14   Wt 74.4 kg (164 lb)   SpO2 98%   BMI 24.94 kg/m    Physical Exam  GENERAL APPEARANCE: healthy, alert and no distress  EYES: Eyes grossly normal to inspection, PERRL and conjunctivae and sclerae normal  HENT: ear canals and TM's normal, nose and mouth without ulcers or lesions, oropharynx clear and oral mucous membranes moist  NECK: no adenopathy, no asymmetry, masses, or scars and thyroid normal to palpation  RESP: lungs clear to auscultation - no rales, rhonchi or wheezes  CV: regular rate and rhythm, normal S1 S2, no S3 or S4, no murmur, click or rub, no peripheral edema and peripheral pulses strong  ABDOMEN: soft, nontender, no hepatosplenomegaly, no masses and bowel sounds normal  MS: no musculoskeletal defects are noted and gait is age appropriate without ataxia  SKIN: no suspicious lesions or rashes  NEURO: Normal strength and tone, sensory exam grossly normal, mentation intact and speech normal  PSYCH: mentation appears normal and affect normal/bright    Diagnostic  "Test Results:  Labs reviewed in Epic    ASSESSMENT/PLAN:       ICD-10-CM    1. Routine general medical examination at a health care facility  Z00.00    2. Hypercholesterolemia  E78.00 Lipid panel reflex to direct LDL Fasting   3. Osteopenia of spine  M85.88    4. Snoring  R06.83    5. Poor sleep  Z72.820    6. Screening for diabetes mellitus  Z13.1 GLUCOSE   7. Left knee pain, unspecified chronicity  M25.562 [facility]   8. Pain in both wrists  M25.531 [facility] Ref[facility]    M25.532    9. Arthralgia of both hands  M25.541     M25.542       Routine general medical examination at a health care facility  Doing well   Screening glucose she returns for fasting labs  Recommended flu shot  Mammogram completed 9/2021  Colonoscopy completed in 2019 with polyps, repeat due after 5 years  She has received both doses of the Moderna COVID-19 vaccine.    Pap with HPV cotesting completed 9/4/2020 with repeat due in 2025       Hypercholesterolemia  She will return for fasting lipids     Osteopenia of spine  Plan for repeat DEXA in 2022.  Continue calcium and vitamin D supplementation    Snoring, poor sleep.  Evaluated by sleep specialist in 10/2020  She does not want to do a sleep study. Symptoms are not to a degree that they are bothersome.     For sleep visit note: \"# RILEY: diagnosed via research PSG, so will need to get a clinical PSG, although we expect results to be in the same range                - PSG  #EDS: ESS 16. Could be multifactorial, if she has untreated sleep apnea plus the behavioral-insomnia that she reports which could be limiting total sleep time.  # Chronic insomnia: This was not the initial reason she made today's visit, but we uncovered some of this during our conversation.  Sounds like most of this is likely behavioral, with late night screen use, nighttime awakenings with stimulating work on the computer, and when she has difficulty going back to sleep continues to use her screen and does " "other maladaptive behaviors.  We discussed these behaviors and ways that she might be able to mitigate the risks including improving her sleep hygiene and wearing bluelight glasses after 9 PM.  There also could be a perimenopausal component to this, suggested a little bit by the hip gram seen in the research study, we will evaluate further with a new PSG data.  If this continues to be a problem despite improved sleep hygiene, we can consider a CBT-I referral.\"    Arthralgias both hands  Last year she was having some pain in the knuckles of her fingers in both hands.  She did some lab testing with her previous PCP which did not appear to show evidence of significant inflammation.  She has had a similar experience recently with more pain.  Her hands felt hot.  She saw her homeopath and in their discussion she mentioned that she is allergic to tomatoes.  Her homeopath suggested stopping tomatoes for a week and then trying them again.  Her pain improved when she stopped the tomatoes and she will be trying to be exposed to titers again tomorrow.  If it does appear that this is linked to eating tomatoes, no need for further evaluation.  If she does have recurrent pain outside of eating tomatoes, I recommended following up with me in a consider repeat lab evaluation possible rheumatology referral    Bilateral wrist pain, left knee pain  She requested PT referral for both today.    COUNSELING:  Reviewed preventive health counseling, as reflected in patient instructions    Estimated body mass index is 24.94 kg/m  as calculated from the following:    Height as of 10/28/20: 1.727 m (5' 8\").    Weight as of this encounter: 74.4 kg (164 lb).        She reports that she has never smoked. She has never used smokeless tobacco.      Counseling Resources:  ATP IV Guidelines  Pooled Cohorts Equation Calculator  Breast Cancer Risk Calculator  BRCA-Related Cancer Risk Assessment: FHS-7 Tool  FRAX Risk Assessment  ICSI Preventive " Guidelines  Dietary Guidelines for Americans, 2010  USDA's MyPlate  ASA Prophylaxis  Lung CA Screening    Meli Chase MD, MD  [facility]  Answers for HPI/ROS submitted by the patient on 9/15/2021  If you checked off any problems, how difficult have these problems made it for you to do your work, take care of things at home, or get along with other people?: Not difficult at all  PHQ9 TOTAL SCORE: 3

## 2021-09-16 ASSESSMENT — PATIENT HEALTH QUESTIONNAIRE - PHQ9: SUM OF ALL RESPONSES TO PHQ QUESTIONS 1-9: 3

## 2021-09-17 ENCOUNTER — MYC MEDICAL ADVICE (OUTPATIENT)
Dept: FAMILY MEDICINE | Facility: CLINIC | Age: 61
End: 2021-09-17

## 2021-09-22 ENCOUNTER — THERAPY VISIT (OUTPATIENT)
Dept: OCCUPATIONAL THERAPY | Facility: CLINIC | Age: 61
End: 2021-09-22
Attending: FAMILY MEDICINE
Payer: COMMERCIAL

## 2021-09-22 DIAGNOSIS — M79.641 PAIN IN BOTH HANDS: ICD-10-CM

## 2021-09-22 DIAGNOSIS — M25.531 PAIN IN BOTH WRISTS: Primary | ICD-10-CM

## 2021-09-22 DIAGNOSIS — M79.642 PAIN IN BOTH HANDS: ICD-10-CM

## 2021-09-22 DIAGNOSIS — M25.532 PAIN IN BOTH WRISTS: Primary | ICD-10-CM

## 2021-09-22 PROCEDURE — 97535 SELF CARE MNGMENT TRAINING: CPT | Mod: GO | Performed by: OCCUPATIONAL THERAPIST

## 2021-09-22 PROCEDURE — 97166 OT EVAL MOD COMPLEX 45 MIN: CPT | Mod: GO | Performed by: OCCUPATIONAL THERAPIST

## 2021-09-22 NOTE — PROGRESS NOTES
Hand Therapy Initial Evaluation    Current Date:  9/22/2021    Diagnosis: B hand pain (MPj/PIPj all fingers and based of fingers/webspaces), B wrist pain (mainly ulnar)  Onset ~6-8 weeks ago, July 2021    Precautions: none    Subjective:  Andreia Jorgensen is a 61 year old female.    Patient reports symptoms of the bilateral hands, wrist which occurred due to as above. A year ago, symptoms started with pain, between the base of the fingers, bilaterally.   Was doing an exercise lifting 5 pound weights, a motion of raising the arms up in front. It was not sore right away, but then the wrist, hands became really sore. It would come and go; has not gone away. Food -mainly tomatoes - aggravates it, things got better when cutting out tomatoes. Also does seem to change with eating fries/ potatoes. Stopped weight lifting for now, took September off of the wt lifting. Last September (2020) she did have some pain in the hands, hand blood tests, no positive findings. Since onset symptoms are gradually getting better, but not back to normal and still limiting function.  General health as reported by patient is excellent.  Pertinent medical history includes:Pain at Night/Rest (legs/cramping)  Medical allergies:none.  Surgical history: none.  Medication history: None.    Current occupation is , full time  Job Tasks: Computer Work, Prolonged Sitting    Occupational Profile Information:  Right hand dominant  Prior functional level:  no limitations  Patient reports symptoms of pain  Special tests:  none.    Previous treatment: acupuncture, homeopathic medicine  Barriers include:none  Mobility: No difficulty  Transportation: drives  Currently working in normal job without restrictions  Leisure activities/hobbies: Lately doing [employer]. Does garden. Did rake and that was ok.    Activities affected:  9/22/2021 Pt reports the following is difficult for them to do:   Lifting pots. Gripping, repetitive activity  affects it.   Work: uses both hands at work. Pain actually may improve with mousing (R hand), moving the fingers.  Weight lifting / strengthening class: weights and bands.    Functional Outcome Measure:   Upper Extremity Functional Index Score:  SCORE:   Column Totals: /80: (P) 75   (A lower score indicates greater disability.)    Objective:  Pain Level (Scale 0-10)   9/22/2021   At Rest 2 today   With Use At one point, it was up to 9     Pain Description  Date 9/22/2021   Location B ulnar wrists, B fingers, base of fingers. Had been to the finger PIPj as well.  Sometimes R elbow discomfort. Weights - always aching. Feeling like she doesn't recover, despite going 1-2 times a week.   Pain Quality Aching, Throbbing and the whole hand / wrist would feel warm   Frequency Always some underlying tenderness   Pain is worst  daytime and it would affect her at night. Does have pain more in the morning.   Exacerbated by  did seem to be affected by the exercise class   Relieved by Acupuncture, herbs/soak helped.   Progression Somewhat better since resting     VISUAL INSPECTION:  DATE 9/22/2021 9/22/2021    Right Left   General posture of the upper extremity: [x]   Normal   []  Comments: []   Normal   []  Comments:   Joint alignment: [x]   Normal   []  Comments: []   Normal   []  Comments:   Color:  [x]   Normal   []  Comments: [x]   Normal   []  Comments:   Joint enlargements:  [x]   Normal   []  Comments: [x]   Normal   []  Comments:   Skin Appearance: [x]   Normal   []  Comments: []   Normal   [x]  Comments: thumb tip healing cut from cutting carrots   Other observations:   Some tightness/band of tighter tissue to L adductor webspace     Edema  None, to R side. Possibly mild edema to L ulnar wrist.    Sensation   WNL throughout all nerve distributions; per patient report    ROM  Pain Report: - none  + mild    ++ moderate    +++ severe   Wrist 9/22/2021 9/22/2021   AROM (PROM) R L   Extension 70 65   Flexion 70 some pain to  dorsal hand 70   RD 15 23   UD 60 60   Supination WNL WNL   Pronation WNL WNL  Some discomfort to ulnar dorsal wrist     Tenderness:   Pain Report:  - none    + mild    ++ moderate    +++ severe     Date 9/22/2021 9/22/2021   Side Right  Left   Ulna Styloid + +   TFCC + +   DRUJ - -   Radial Styloid - -   Distal Radius - -   Volar Scaphoid - -   Dorsal Scaphoid - -   MPj of fingers - -   Dorsal wrist     ECU  + at ulnar head, insertion   ECRB - -   ECRL - -   Interossei, between fingers Not sore today, but had been area of irritation / tenderness Not sore today, but had been area of irritation / tenderness       Strength:  Pain-free /Pinch Test    9/22/2021   Trials R L   1   30  56 38 could feel it to ulnar wrist     Lat Pinch  9/22/2021   Trials R L   1   10  Can feel it to radial IF 8     3 Pt Pinch  9/22/2021   Trials R L   1   8 10     Assessment:  Patient presents with symptoms consistent with diagnosis of bilateral wrist and hand  pain,  with conservative intervention.  She appears to have irritation of the ulnar side of the wrist, L>R, with possible ECU tendon involvement or TFCC, distal ulna area irritation. Also finger pain, near the B finger MPj / between the MPj.  It appears the tissues are still irritable, and have been impacted with gripping, lifting, repetitive motions.  At this time, to start therapy with a strategy of safely reengaging in activities.  Patient will also benefit from gentle strengthening and range of motion, teaching and self management of symptoms, and potentially modalities and or orthosis fabrication.    Patient's limitations or Problem List includes:  Pain, Increased edema, Weakness, Decreased  and stiffness of the bilateral wrist and fingers which interferes with the patient's ability to perform Self Care Tasks, Work Tasks, Recreational Activities and Household Chores as compared to previous level of function.    Rehab Potential:  Excellent - Return to full activity,  no limitations    Patient will benefit from skilled Occupational Therapy to increase ROM, overall strength and self management of symptoms and decrease pain to return to previous activity level and resume normal daily tasks and to reach their rehab potential.    Barriers to Learning:  No barrier    Communication Issues:  Patient appears to be able to clearly communicate and understand verbal and written communication and follow directions correctly.    Chart Review: Brief history including review of medical and/or therapy records relating to the presenting problem and Detailed history review with patient    Identified Performance Deficits: home establishment and management, meal preparation and cleanup and leisure activities    Assessment of Occupational Performance:  3-5 Performance Deficits    Clinical Decision Making (Complexity): Moderate complexity    Treatment Explanation:  The following has been discussed with the patient:  RX ordered/plan of care  Anticipated outcomes  Possible risks and side effects    Plan:  Frequency:  1 X week, once daily  Duration:  for 4 weeks tapering to 2 X a month over 8 weeks    Treatment Plan:   Modalities:  US, Fluidotherapy and Paraffin  Therapeutic Exercise:  AROM, Tendon Gliding, Blocking, Contract Relax, Extensor Tracking, Isotonics, Isometrics and Stabilization  Neuromuscular re-education:  Nerve Gliding, Coordination/Dexterity, Desensitization, Proprioceptive Training and Kinesiotaping  Manual Techniques:  Joint mobilization, Friction massage, Myofascial release and Manual edema mobilization  Orthotic Fabrication:  Static, Finger based, Hand based and Forearm based  Self Care:  Self Care Tasks, Ergonomic Considerations and Work Tasks    Discharge Plan:  Achieve all LTG.  Independent in home treatment program.  Reach maximal therapeutic benefit.    Home Exercise Program:  Pt has been Keeping a food diary  Joint protection:   Keep forearms / wrists in in neutral: thumbs  up  Build up handles or make them grippy (cabinet liner, friction tape, rubber bands)  Briefly discussed weight lifting and or bike gloves  Wrist splint / neoprene support potentially for the L wrist, to wear with weight lifting.     -Using joint protection as above, see how it feels to reengage in UE strengthening activities    Next Visit:  gentle strengthening and range of motion, teaching and self management of symptoms, and potentially modalities and or orthosis fabrication

## 2021-09-23 ENCOUNTER — LAB (OUTPATIENT)
Dept: LAB | Facility: CLINIC | Age: 61
End: 2021-09-23
Payer: COMMERCIAL

## 2021-09-23 DIAGNOSIS — E78.00 HYPERCHOLESTEROLEMIA: ICD-10-CM

## 2021-09-23 DIAGNOSIS — Z13.1 SCREENING FOR DIABETES MELLITUS: ICD-10-CM

## 2021-09-23 LAB
CHOLEST SERPL-MCNC: 247 MG/DL
FASTING STATUS PATIENT QL REPORTED: YES
FASTING STATUS PATIENT QL REPORTED: YES
GLUCOSE BLD-MCNC: 88 MG/DL (ref 70–99)
HDLC SERPL-MCNC: 57 MG/DL
LDLC SERPL CALC-MCNC: 164 MG/DL
NONHDLC SERPL-MCNC: 190 MG/DL
TRIGL SERPL-MCNC: 128 MG/DL

## 2021-09-23 PROCEDURE — 82947 ASSAY GLUCOSE BLOOD QUANT: CPT

## 2021-09-23 PROCEDURE — 80061 LIPID PANEL: CPT

## 2021-09-23 PROCEDURE — 36415 COLL VENOUS BLD VENIPUNCTURE: CPT

## 2021-09-28 NOTE — RESULT ENCOUNTER NOTE
The results of your recent lipid (cholesterol) profile were stable.      Here are the results:  Lab Results       Component                Value               Date                       CHOL                     247                 09/23/2021                 CHOL                     248                 09/01/2017            Lab Results       Component                Value               Date                       HDL                      57                  09/23/2021                 HDL                      78                  09/01/2017            Lab Results       Component                Value               Date                       LDL                      164                 09/23/2021                 LDL                      147                 09/01/2017            Lab Results       Component                Value               Date                       TRIG                     128                 09/23/2021                 TRIG                     115                 09/01/2017            Lab Results       Component                Value               Date                       CHOLHDLRATIO             3.0                 03/13/2015              Desired or goal levels are:  CHOLESTEROL: Desirable is less than 200.   HDL (Good Cholesterol): Desirable is greater than 40 (for men) greater than 50 (for women).  LDL (Bad Cholesterol): Desirable is less than 130 (or less than 100 if you have heart disease or diabetes). Borderline 130-160.  TRIGLYCERIDES: Desirable is less than 150.  Borderline is 150-200.    To help lower your LDL (bad cholesterol) you could start adding ground flax seed to your diet. The recommended dose is 2 heaping tablespoonfuls daily, you may want to start with 1 tablespoonful and increase to 2 heaping tablespoonfuls. You can mix or add ground flax seed to hot cereals, yogurt, applesauce, cottage cheese or any sauce mixture that is your portion. Ground flax seed can be found in the baking  aisle near the flour.  .    As you may know, an elevated cholesterol is one factor that increases your risk for heart disease and stroke. You can improve your cholesterol by controlling the amount and type of fat you eat and by increasing your daily activity level.    Here are some ways to improve your nutrition:  Eat less fat (especially butter, Crisco and other saturated fats)  Buy lean cuts of meat, reduce your portions of red meat or substitute poultry or fish  Use skim milk and low-fat dairy products  Eat no more than 4 egg yolks per week  Avoid fried or fast foods that are high in fat  Eat more fruits and vegetables      Also consider starting or increasing your aerobic activity. Aerobic activity is the best way to improve HDL (good) cholesterol. If this would be new to you, please talk with me first about what activities are safe for you.      Other lab results:    Your glucose (blood sugar) was normal.     Please feel free to contact us with any questions or if you would like more information.    Meli Chase M.D.

## 2021-09-29 ENCOUNTER — THERAPY VISIT (OUTPATIENT)
Dept: OCCUPATIONAL THERAPY | Facility: CLINIC | Age: 61
End: 2021-09-29
Payer: COMMERCIAL

## 2021-09-29 DIAGNOSIS — M79.641 PAIN IN BOTH HANDS: Primary | ICD-10-CM

## 2021-09-29 DIAGNOSIS — M25.531 PAIN IN BOTH WRISTS: ICD-10-CM

## 2021-09-29 DIAGNOSIS — M25.532 PAIN IN BOTH WRISTS: ICD-10-CM

## 2021-09-29 DIAGNOSIS — M79.642 PAIN IN BOTH HANDS: Primary | ICD-10-CM

## 2021-09-29 PROCEDURE — 97110 THERAPEUTIC EXERCISES: CPT | Mod: GO | Performed by: OCCUPATIONAL THERAPIST

## 2021-09-29 PROCEDURE — 97535 SELF CARE MNGMENT TRAINING: CPT | Mod: GO | Performed by: OCCUPATIONAL THERAPIST

## 2021-10-06 ENCOUNTER — THERAPY VISIT (OUTPATIENT)
Dept: PHYSICAL THERAPY | Facility: CLINIC | Age: 61
End: 2021-10-06
Attending: FAMILY MEDICINE
Payer: COMMERCIAL

## 2021-10-06 DIAGNOSIS — M25.562 LEFT KNEE PAIN, UNSPECIFIED CHRONICITY: ICD-10-CM

## 2021-10-06 PROCEDURE — 97161 PT EVAL LOW COMPLEX 20 MIN: CPT | Mod: GP | Performed by: PHYSICAL THERAPIST

## 2021-10-06 PROCEDURE — 97110 THERAPEUTIC EXERCISES: CPT | Mod: GP | Performed by: PHYSICAL THERAPIST
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
## 2021-10-07 PROBLEM — M25.562 LEFT KNEE PAIN, UNSPECIFIED CHRONICITY: Status: ACTIVE | Noted: 2021-10-07

## 2021-10-07 ASSESSMENT — ACTIVITIES OF DAILY LIVING (ADL)
WEAKNESS: THE SYMPTOM AFFECTS MY ACTIVITY SLIGHTLY
GO DOWN STAIRS: ACTIVITY IS SOMEWHAT DIFFICULT
GIVING WAY, BUCKLING OR SHIFTING OF KNEE: I DO NOT HAVE THE SYMPTOM
SIT WITH YOUR KNEE BENT: ACTIVITY IS NOT DIFFICULT
SWELLING: I DO NOT HAVE THE SYMPTOM
KNEE_ACTIVITY_OF_DAILY_LIVING_SUM: 55
STAND: ACTIVITY IS NOT DIFFICULT
HOW_WOULD_YOU_RATE_THE_OVERALL_FUNCTION_OF_YOUR_KNEE_DURING_YOUR_USUAL_DAILY_ACTIVITIES?: NEARLY NORMAL
KNEEL ON THE FRONT OF YOUR KNEE: ACTIVITY IS FAIRLY DIFFICULT
SQUAT: ACTIVITY IS MINIMALLY DIFFICULT
WALK: ACTIVITY IS MINIMALLY DIFFICULT
HOW_WOULD_YOU_RATE_THE_CURRENT_FUNCTION_OF_YOUR_KNEE_DURING_YOUR_USUAL_DAILY_ACTIVITIES_ON_A_SCALE_FROM_0_TO_100_WITH_100_BEING_YOUR_LEVEL_OF_KNEE_FUNCTION_PRIOR_TO_YOUR_INJURY_AND_0_BEING_THE_INABILITY_TO_PERFORM_ANY_OF_YOUR_USUAL_DAILY_ACTIVITIES?: 75
KNEE_ACTIVITY_OF_DAILY_LIVING_SCORE: 78.57
RAW_SCORE: 55
GO UP STAIRS: ACTIVITY IS SOMEWHAT DIFFICULT
LIMPING: I DO NOT HAVE THE SYMPTOM
RISE FROM A CHAIR: ACTIVITY IS MINIMALLY DIFFICULT
STIFFNESS: I HAVE THE SYMPTOM BUT IT DOES NOT AFFECT MY ACTIVITY
AS_A_RESULT_OF_YOUR_KNEE_INJURY,_HOW_WOULD_YOU_RATE_YOUR_CURRENT_LEVEL_OF_DAILY_ACTIVITY?: NEARLY NORMAL
PAIN: THE SYMPTOM AFFECTS MY ACTIVITY SLIGHTLY

## 2021-10-08 ENCOUNTER — THERAPY VISIT (OUTPATIENT)
Dept: OCCUPATIONAL THERAPY | Facility: CLINIC | Age: 61
End: 2021-10-08
Payer: COMMERCIAL

## 2021-10-08 DIAGNOSIS — M25.531 PAIN IN BOTH WRISTS: ICD-10-CM

## 2021-10-08 DIAGNOSIS — M79.642 PAIN IN BOTH HANDS: Primary | ICD-10-CM

## 2021-10-08 DIAGNOSIS — M25.532 PAIN IN BOTH WRISTS: ICD-10-CM

## 2021-10-08 DIAGNOSIS — M79.641 PAIN IN BOTH HANDS: Primary | ICD-10-CM

## 2021-10-08 PROCEDURE — 97110 THERAPEUTIC EXERCISES: CPT | Mod: GO | Performed by: OCCUPATIONAL THERAPIST

## 2021-10-08 PROCEDURE — 97535 SELF CARE MNGMENT TRAINING: CPT | Mod: GO | Performed by: OCCUPATIONAL THERAPIST

## 2021-10-08 NOTE — PROGRESS NOTES
Discharge Note - Hand Therapy      Current Date:  10/8/2021    Diagnosis: B hand pain (MPj/PIPj all fingers and based of fingers/webspaces), B wrist pain (mainly ulnar)  Onset ~6-8 weeks ago, July 2021    Precautions: none    Subjective:  It seems like it has subsided some, I would have to feel, look for it.  Patient ready to discharge from formal therapy but continue home program.    Functional Outcome Measure:   Upper Extremity Functional Index Score:  Upper Extremity Functional Index Score:  SCORE:   Column Totals: /80: 73   (A lower score indicates greater disability.)    Objective:  Pain Level (Scale 0-10)   9/22/2021 10/9/2021     At Rest 2 today None to mild   With Use At one point, it was up to 9 Mild       Pain Description  Date 9/22/2021   Location B ulnar wrists, B fingers, base of fingers. Had been to the finger PIPj as well.  Sometimes R elbow discomfort. Weights - always aching. Feeling like she doesn't recover, despite going 1-2 times a week.   Pain Quality Aching, Throbbing and the whole hand / wrist would feel warm   Frequency Always some underlying tenderness   Pain is worst  daytime and it would affect her at night. Does have pain more in the morning.   Exacerbated by  did seem to be affected by the exercise class   Relieved by Acupuncture, herbs/soak helped.   Progression Somewhat better since resting     VISUAL INSPECTION:  DATE 9/22/2021 9/22/2021    Right Left   General posture of the upper extremity: [x]   Normal   []  Comments: []   Normal   []  Comments:   Joint alignment: [x]   Normal   []  Comments: []   Normal   []  Comments:   Color:  [x]   Normal   []  Comments: [x]   Normal   []  Comments:   Joint enlargements:  [x]   Normal   []  Comments: [x]   Normal   []  Comments:   Skin Appearance: [x]   Normal   []  Comments: []   Normal   [x]  Comments: thumb tip healing cut from cutting carrots   Other observations:   Some tightness/band of tighter tissue to L adductor webspace      Edema  None, to R side. Possibly mild edema to L ulnar wrist.    Sensation   WNL throughout all nerve distributions; per patient report    ROM  Pain Report: - none  + mild    ++ moderate    +++ severe   Wrist 9/22/2021 9/22/2021   AROM (PROM) R L   Extension 70 65   Flexion 70 some pain to dorsal hand 70   RD 15 23   UD 60 60   Supination WNL WNL   Pronation WNL WNL  Some discomfort to ulnar dorsal wrist     Tenderness:   Pain Report:  - none    + mild    ++ moderate    +++ severe     Date 9/22/2021 9/22/2021   Side Right  Left   Ulna Styloid + +   TFCC + +   DRUJ - -   Radial Styloid - -   Distal Radius - -   Volar Scaphoid - -   Dorsal Scaphoid - -   MPj of fingers - -   Dorsal wrist     ECU  + at ulnar head, insertion   ECRB - -   ECRL - -   Interossei, between fingers Not sore today, but had been area of irritation / tenderness Not sore today, but had been area of irritation / tenderness       Strength:  Pain-free /Pinch Test    9/22/2021   Trials R L   1   30  56 38 could feel it to ulnar wrist     Lat Pinch  9/22/2021   Trials R L   1   10  Can feel it to radial IF 8     3 Pt Pinch  9/22/2021   Trials R L   1   8 10     Assessment:  Response to therapy has been improvement to:  Pain:  frequency is less, intensity of pain is decreased and duration of pain is decreased  Appropriateness of Rx I have re-evaluated this patient and find that the nature, scope, duration and intensity of the therapy is appropriate for the medical condition of the patient.  Overall Assessment:  Patient is progressing well.  Patient is ready to be discharged from therapy and continue their home treatment program.  STG/LTG:  See goal sheet for details and updates.    Plan:  Frequency/Duration:  Discharge from Hand Therapy; continue home program.

## 2021-10-09 PROBLEM — M79.642 PAIN IN BOTH HANDS: Status: RESOLVED | Noted: 2021-09-22 | Resolved: 2021-10-09

## 2021-10-09 PROBLEM — M79.641 PAIN IN BOTH HANDS: Status: RESOLVED | Noted: 2021-09-22 | Resolved: 2021-10-09

## 2021-10-09 PROBLEM — M25.532 PAIN IN BOTH WRISTS: Status: RESOLVED | Noted: 2021-09-22 | Resolved: 2021-10-09

## 2021-10-09 PROBLEM — M25.531 PAIN IN BOTH WRISTS: Status: RESOLVED | Noted: 2021-09-22 | Resolved: 2021-10-09

## 2021-10-12 ENCOUNTER — THERAPY VISIT (OUTPATIENT)
Dept: PHYSICAL THERAPY | Facility: CLINIC | Age: 61
End: 2021-10-12
Payer: COMMERCIAL

## 2021-10-12 DIAGNOSIS — M25.562 LEFT KNEE PAIN, UNSPECIFIED CHRONICITY: ICD-10-CM

## 2021-10-12 PROCEDURE — 97140 MANUAL THERAPY 1/> REGIONS: CPT | Mod: GP | Performed by: PHYSICAL THERAPIST

## 2021-10-12 PROCEDURE — 97110 THERAPEUTIC EXERCISES: CPT | Mod: GP | Performed by: PHYSICAL THERAPIST

## 2021-10-21 ENCOUNTER — THERAPY VISIT (OUTPATIENT)
Dept: PHYSICAL THERAPY | Facility: CLINIC | Age: 61
End: 2021-10-21
Payer: COMMERCIAL

## 2021-10-21 DIAGNOSIS — M25.562 LEFT KNEE PAIN, UNSPECIFIED CHRONICITY: ICD-10-CM

## 2021-10-21 PROCEDURE — 97530 THERAPEUTIC ACTIVITIES: CPT | Mod: GP | Performed by: PHYSICAL THERAPIST

## 2021-10-21 PROCEDURE — 97110 THERAPEUTIC EXERCISES: CPT | Mod: GP | Performed by: PHYSICAL THERAPIST

## 2021-11-22 ENCOUNTER — THERAPY VISIT (OUTPATIENT)
Dept: PHYSICAL THERAPY | Facility: CLINIC | Age: 61
End: 2021-11-22
Payer: COMMERCIAL

## 2021-11-22 DIAGNOSIS — M25.562 LEFT KNEE PAIN, UNSPECIFIED CHRONICITY: ICD-10-CM

## 2021-11-22 PROCEDURE — 97110 THERAPEUTIC EXERCISES: CPT | Mod: GP | Performed by: PHYSICAL THERAPIST

## 2021-11-22 PROCEDURE — 97530 THERAPEUTIC ACTIVITIES: CPT | Mod: GP | Performed by: PHYSICAL THERAPIST

## 2021-11-22 ASSESSMENT — ACTIVITIES OF DAILY LIVING (ADL)
STAND: ACTIVITY IS NOT DIFFICULT
HOW_WOULD_YOU_RATE_THE_CURRENT_FUNCTION_OF_YOUR_KNEE_DURING_YOUR_USUAL_DAILY_ACTIVITIES_ON_A_SCALE_FROM_0_TO_100_WITH_100_BEING_YOUR_LEVEL_OF_KNEE_FUNCTION_PRIOR_TO_YOUR_INJURY_AND_0_BEING_THE_INABILITY_TO_PERFORM_ANY_OF_YOUR_USUAL_DAILY_ACTIVITIES?: 90
KNEE_ACTIVITY_OF_DAILY_LIVING_SCORE: 90
LIMPING: I DO NOT HAVE THE SYMPTOM
RAW_SCORE: 63
SIT WITH YOUR KNEE BENT: ACTIVITY IS NOT DIFFICULT
RISE FROM A CHAIR: ACTIVITY IS NOT DIFFICULT
HOW_WOULD_YOU_RATE_THE_OVERALL_FUNCTION_OF_YOUR_KNEE_DURING_YOUR_USUAL_DAILY_ACTIVITIES?: NORMAL
SWELLING: I DO NOT HAVE THE SYMPTOM
KNEE_ACTIVITY_OF_DAILY_LIVING_SUM: 63
STIFFNESS: I HAVE THE SYMPTOM BUT IT DOES NOT AFFECT MY ACTIVITY
GIVING WAY, BUCKLING OR SHIFTING OF KNEE: I DO NOT HAVE THE SYMPTOM
WEAKNESS: I HAVE THE SYMPTOM BUT IT DOES NOT AFFECT MY ACTIVITY
KNEEL ON THE FRONT OF YOUR KNEE: ACTIVITY IS SOMEWHAT DIFFICULT
WALK: ACTIVITY IS NOT DIFFICULT
SQUAT: ACTIVITY IS MINIMALLY DIFFICULT
GO UP STAIRS: ACTIVITY IS MINIMALLY DIFFICULT
GO DOWN STAIRS: ACTIVITY IS MINIMALLY DIFFICULT
AS_A_RESULT_OF_YOUR_KNEE_INJURY,_HOW_WOULD_YOU_RATE_YOUR_CURRENT_LEVEL_OF_DAILY_ACTIVITY?: NEARLY NORMAL
PAIN: I DO NOT HAVE THE SYMPTOM

## 2021-11-24 PROBLEM — M25.562 LEFT KNEE PAIN, UNSPECIFIED CHRONICITY: Status: RESOLVED | Noted: 2021-10-07 | Resolved: 2021-11-24

## 2022-04-07 ENCOUNTER — TRANSFERRED RECORDS (OUTPATIENT)
Dept: HEALTH INFORMATION MANAGEMENT | Facility: CLINIC | Age: 62
End: 2022-04-07
Payer: COMMERCIAL

## 2022-05-26 ENCOUNTER — MYC MEDICAL ADVICE (OUTPATIENT)
Dept: FAMILY MEDICINE | Facility: CLINIC | Age: 62
End: 2022-05-26
Payer: COMMERCIAL

## 2022-07-13 ENCOUNTER — THERAPY VISIT (OUTPATIENT)
Dept: PHYSICAL THERAPY | Facility: CLINIC | Age: 62
End: 2022-07-13
Payer: COMMERCIAL

## 2022-07-13 DIAGNOSIS — S93.401A SPRAIN OF RIGHT ANKLE: ICD-10-CM

## 2022-07-13 PROCEDURE — 97161 PT EVAL LOW COMPLEX 20 MIN: CPT | Mod: GP | Performed by: PHYSICAL THERAPIST

## 2022-07-13 PROCEDURE — 97110 THERAPEUTIC EXERCISES: CPT | Mod: GP | Performed by: PHYSICAL THERAPIST

## 2022-07-13 NOTE — LETTER
[facility]  [address] [place] [address]  [phone]    2022    Re: Andreia Jorgensen   :   1960  MRN:  [id number]   REFERRING PHYSICIAN:   Leta KOENIG [facility]    Date of Initial Evaluation:  2022  Visits:  Rxs Used: 1  Reason for Referral:  Sprain of right ankle    EVALUATION SUMMARY    Physical Therapy Initial Evaluation  Subjective:    Patient Health History  Andreia Jorgensen being seen for Physical Therapy.   Problem began: 7/15/2021.   Problem occurred: Twisted ankle on    Pain is reported as 1/10 on pain scale.  General health as reported by patient is excellent.  Pertinent medical history includes: none.   Red flags:  None as reported by patient.  Medical allergies: other. Other medical allergies details: Penicillin.   Surgeries include:  None.    Current medications:  None.    Current occupation is Web speciality.   Primary job tasks include:  Computer work, driving, lifting/carrying, prolonged sitting and repetitive tasks.                Therapist Generated HPI Evaluation  Problem details: Pt presents to PT with a chief complaint of R ankle pain with reported initial onset ~1 year ago (2021) after twisting her ankle on a . Pt treated her ankle independently with activity modification but has started to noticed increased pain and instability with walking over the past few months as well as increased R great toe stiffness. Pt diagnosed with a R ankle sprain and Hallux rigidus and referred to PT. .         Type of problem:  Right ankle.  This is a chronic condition.  Condition occurred with:  A fall/slip.  Where condition occurred: at home.  Patient reports pain:  Lateral and great toe.  Pain is described as aching and is intermittent.  Pain radiates to:  No radiation. Pain is the same all the time.  Since onset symptoms are gradually  worsening.  Associated symptoms:  Buckling/giving out, loss of motion/stiffness and loss of strength. Symptoms are exacerbated by bending/squatting, standing, walking, ascending stairs and descending stairs    Re: Andreia Jorgensen   :   1960    Special tests included:  X-ray.  Barriers include:  None as reported by patient.    Objective:  Standing Alignment:    Ankle/Foot:  Hallux valgus R  Ankle/Foot Evaluation  ROM:    PROM:    Dorsiflexion:  Left:    0     Right:   0   Inversion: Left:          Right:   40  Eversion: Left:      Right:  30  Pain: Pain + with end range R ankle INV    Strength:    Dorsiflexion:  Left: 5/5     Pain:   Right: 5/5   Pain:  Inversion:Left: 5/5  Pain:     Right: 5-/5  Pain:  Eversion:Left: 5-/5  Pain:  Right: 5-/5   Pain:+    FUNCTIONAL TESTS:     Quad:  Single Leg Squat Left: Control is mild loss of control.  Single Leg Squat Right: Control is moderate loss of control and femoral IR    Assessment/Plan:    Patient is a 62 year old female with right side ankle complaints.    Patient has the following significant findings with corresponding treatment plan.                Diagnosis 1:  R ankle pain/instability  Pain -  manual therapy, splint/taping/bracing/orthotics, self management, education and home program  Decreased ROM/flexibility - manual therapy and therapeutic exercise  Decreased joint mobility - manual therapy and therapeutic exercise  Decreased strength - therapeutic exercise and therapeutic activities  Impaired muscle performance - neuro re-education  Instability -  Therapeutic Activity  Therapeutic Exercise    Therapy Evaluation Codes:   Cumulative Therapy Evaluation is: Low complexity.    Previous and current functional limitations:  (See Goal Flow Sheet for this information)    Short term and Long term goals: (See Goal Flow Sheet for this information)   Communication ability:  Patient appears to be able to clearly communicate and understand verbal and written  communication and follow directions correctly.  Treatment Explanation - The following has been discussed with the patient:   RX ordered/plan of care  Anticipated outcomes  Possible risks and side effects  This patient would benefit from PT intervention to resume normal activities.   Rehab potential is good.    Re: Andreia Jorgensen   :   1960    Frequency:  1 X week, once daily  Duration:  for 4 weeks then 2 x month for 1 month  Discharge Plan:  Achieve all LTG.  Independent in home treatment program.  Reach maximal therapeutic benefit.    Thank you for your referral.    INQUIRIES  Therapist:Cipriano Collazo PT  [facility] [address] [place] [address]  Phone: [phone]  Fax: [phone]

## 2022-07-13 NOTE — PROGRESS NOTES
Physical Therapy Initial Evaluation  Subjective:    Patient Health History  Andreia Jorgensen being seen for Physical Therapy.     Problem began: 7/15/2021.   Problem occurred: Twisted ankle on    Pain is reported as 1/10 on pain scale.  General health as reported by patient is excellent.  Pertinent medical history includes: none.   Red flags:  None as reported by patient.  Medical allergies: other. Other medical allergies details: Penicillin.   Surgeries include:  None.    Current medications:  None.    Current occupation is Web speciality.   Primary job tasks include:  Computer work, driving, lifting/carrying, prolonged sitting and repetitive tasks.                  Therapist Generated HPI Evaluation  Problem details: Pt presents to PT with a chief complaint of R ankle pain with reported initial onset ~1 year ago (07/2021) after twisting her ankle on a . Pt treated her ankle independently with activity modification but has started to noticed increased pain and instability with walking over the past few months as well as increased R great toe stiffness. Pt diagnosed with a R ankle sprain and Hallux rigidus and referred to PT. .         Type of problem:  Right ankle.    This is a chronic condition.  Condition occurred with:  A fall/slip.  Where condition occurred: at home.  Patient reports pain:  Lateral and great toe.  Pain is described as aching and is intermittent.  Pain radiates to:  No radiation. Pain is the same all the time.  Since onset symptoms are gradually worsening.  Associated symptoms:  Buckling/giving out, loss of motion/stiffness and loss of strength. Symptoms are exacerbated by bending/squatting, standing, walking, ascending stairs and descending stairs    Special tests included:  X-ray.    Barriers include:  None as reported by patient.                        Objective:  Standing Alignment:                Ankle/Foot:  Hallux valgus R              Ankle/Foot Evaluation  ROM:      PROM:     Dorsiflexion:  Left:    0     Right:   0     Inversion: Left:          Right:   40  Eversion: Left:      Right:  30      Pain: Pain + with end range R ankle INV    Strength:    Dorsiflexion:  Left: 5/5     Pain:   Right: 5/5   Pain:    Inversion:Left: 5/5  Pain:     Right: 5-/5  Pain:  Eversion:Left: 5-/5  Pain:  Right: 5-/5   Pain:+                            FUNCTIONAL TESTS:         Quad:  Single Leg Squat Left: Control is mild loss of control.  Single Leg Squat Right: Control is moderate loss of control and femoral IR                                                          General     ROS    Assessment/Plan:    Patient is a 62 year old female with right side ankle complaints.    Patient has the following significant findings with corresponding treatment plan.                Diagnosis 1:  R ankle pain/instability  Pain -  manual therapy, splint/taping/bracing/orthotics, self management, education and home program  Decreased ROM/flexibility - manual therapy and therapeutic exercise  Decreased joint mobility - manual therapy and therapeutic exercise  Decreased strength - therapeutic exercise and therapeutic activities  Impaired muscle performance - neuro re-education  Instability -  Therapeutic Activity  Therapeutic Exercise    Therapy Evaluation Codes:   Cumulative Therapy Evaluation is: Low complexity.    Previous and current functional limitations:  (See Goal Flow Sheet for this information)    Short term and Long term goals: (See Goal Flow Sheet for this information)     Communication ability:  Patient appears to be able to clearly communicate and understand verbal and written communication and follow directions correctly.  Treatment Explanation - The following has been discussed with the patient:   RX ordered/plan of care  Anticipated outcomes  Possible risks and side effects  This patient would benefit from PT intervention to resume normal activities.   Rehab potential is good.    Frequency:  1 X week,  once daily  Duration:  for 4 weeks then 2 x month for 1 month  Discharge Plan:  Achieve all LTG.  Independent in home treatment program.  Reach maximal therapeutic benefit.    Please refer to the daily flowsheet for treatment today, total treatment time and time spent performing 1:1 timed codes.

## 2022-07-14 PROBLEM — S93.401A SPRAIN OF RIGHT ANKLE: Status: ACTIVE | Noted: 2022-07-14

## 2022-09-07 ENCOUNTER — ANCILLARY PROCEDURE (OUTPATIENT)
Dept: MAMMOGRAPHY | Facility: CLINIC | Age: 62
End: 2022-09-07
Attending: FAMILY MEDICINE
Payer: COMMERCIAL

## 2022-09-07 DIAGNOSIS — Z12.31 VISIT FOR SCREENING MAMMOGRAM: ICD-10-CM

## 2022-09-07 PROCEDURE — 77067 SCR MAMMO BI INCL CAD: CPT | Mod: TC | Performed by: RADIOLOGY

## 2022-09-07 PROCEDURE — 77063 BREAST TOMOSYNTHESIS BI: CPT | Mod: TC | Performed by: RADIOLOGY

## 2022-09-13 ASSESSMENT — ENCOUNTER SYMPTOMS
HEMATOCHEZIA: 0
FEVER: 0
HEADACHES: 0
SORE THROAT: 0
FREQUENCY: 0
COUGH: 0
BREAST MASS: 0
EYE PAIN: 0
WEAKNESS: 1
SHORTNESS OF BREATH: 0
ARTHRALGIAS: 1
DIZZINESS: 0
DYSURIA: 0
CONSTIPATION: 0
HEMATURIA: 0
ABDOMINAL PAIN: 0
PALPITATIONS: 0
CHILLS: 0
DIARRHEA: 0
HEARTBURN: 0
JOINT SWELLING: 0
MYALGIAS: 0
NERVOUS/ANXIOUS: 0
NAUSEA: 0
PARESTHESIAS: 1

## 2022-09-16 ENCOUNTER — TELEPHONE (OUTPATIENT)
Dept: FAMILY MEDICINE | Facility: CLINIC | Age: 62
End: 2022-09-16

## 2022-09-16 DIAGNOSIS — M85.88 OSTEOPENIA OF SPINE: Primary | ICD-10-CM

## 2022-09-16 NOTE — TELEPHONE ENCOUNTER
Order/Referral Request    Who is requesting: patient    Orders being requested: bone density    Reason service is needed/diagnosis: n/a    When are orders needed by: ASAP    Has this been discussed with Provider: No    Does patient have a preference on a Group/Provider/Facility? n/a    Does patient have an appointment scheduled?: Yes:     Where to send orders: N/A    Could we send this information to you in [facility] or would you prefer to receive a phone call?:   Patient would prefer a phone call   Okay to leave a detailed message?: Yes at Cell number on file:    Telephone Information:   Mobile [phone]

## 2022-09-20 ENCOUNTER — OFFICE VISIT (OUTPATIENT)
Dept: FAMILY MEDICINE | Facility: CLINIC | Age: 62
End: 2022-09-20
Payer: COMMERCIAL

## 2022-09-20 VITALS
DIASTOLIC BLOOD PRESSURE: 70 MMHG | HEIGHT: 66 IN | BODY MASS INDEX: 25.07 KG/M2 | HEART RATE: 62 BPM | SYSTOLIC BLOOD PRESSURE: 120 MMHG | OXYGEN SATURATION: 98 % | WEIGHT: 156 LBS

## 2022-09-20 DIAGNOSIS — Z23 HIGH PRIORITY FOR 2019-NCOV VACCINE: ICD-10-CM

## 2022-09-20 DIAGNOSIS — Z00.00 ROUTINE GENERAL MEDICAL EXAMINATION AT A HEALTH CARE FACILITY: Primary | ICD-10-CM

## 2022-09-20 DIAGNOSIS — Z13.220 LIPID SCREENING: ICD-10-CM

## 2022-09-20 DIAGNOSIS — M85.851 OSTEOPENIA OF BOTH HIPS: ICD-10-CM

## 2022-09-20 DIAGNOSIS — M85.852 OSTEOPENIA OF BOTH HIPS: ICD-10-CM

## 2022-09-20 DIAGNOSIS — M85.88 OSTEOPENIA OF LUMBAR SPINE: ICD-10-CM

## 2022-09-20 DIAGNOSIS — Z86.19 H/O COLD SORES: ICD-10-CM

## 2022-09-20 LAB
CHOLEST SERPL-MCNC: 239 MG/DL
HDLC SERPL-MCNC: 55 MG/DL
LDLC SERPL CALC-MCNC: 159 MG/DL
NONHDLC SERPL-MCNC: 184 MG/DL
TRIGL SERPL-MCNC: 124 MG/DL

## 2022-09-20 PROCEDURE — 99396 PREV VISIT EST AGE 40-64: CPT | Performed by: FAMILY MEDICINE

## 2022-09-20 PROCEDURE — 0134A COVID-19,PF,MODERNA BIVALENT: CPT | Performed by: FAMILY MEDICINE

## 2022-09-20 PROCEDURE — 36415 COLL VENOUS BLD VENIPUNCTURE: CPT | Performed by: FAMILY MEDICINE

## 2022-09-20 PROCEDURE — 91313 COVID-19,PF,MODERNA BIVALENT: CPT | Performed by: FAMILY MEDICINE

## 2022-09-20 PROCEDURE — 80061 LIPID PANEL: CPT | Performed by: FAMILY MEDICINE

## 2022-09-20 RX ORDER — VALACYCLOVIR HYDROCHLORIDE 500 MG/1
500 TABLET, FILM COATED ORAL 2 TIMES DAILY
Qty: 18 TABLET | Refills: 2 | Status: SHIPPED | OUTPATIENT
Start: 2022-09-20 | End: 2023-07-24

## 2022-09-20 NOTE — PROGRESS NOTES
"  ASSESSMENT/PLAN:   Andreia was seen today for physical and imm/inj.    Diagnoses and all orders for this visit:    Routine general medical examination at a health care facility    Osteopenia of lumbar spine / Osteopenia of both hips  Evaluate with   -     DX Hip/Pelvis/Spine; Future      H/O cold sores  -     valACYclovir (VALTREX) 500 MG tablet; Take 1 tablet (500 mg) by mouth 2 times daily for 3 days    Lipid screening  -     Lipid panel reflex to direct LDL Fasting    High priority for 2019-nCoV vaccine  -     COVID-19,PF,MODERNA BIVALENT 18+Yrs    Other orders  -     REVIEW OF HEALTH MAINTENANCE PROTOCOL ORDERS  -     Cancel: COVID-19,PF,MODERNA BIVALENT 18+Yrs            COUNSELING:  Reviewed preventive health counseling, as reflected in patient instructions    Estimated body mass index is 25.37 kg/m  as calculated from the following:    Height as of this encounter: 1.67 m (5' 5.75\").    Weight as of this encounter: 70.8 kg (156 lb).        She reports that she has never smoked. She has never used smokeless tobacco.      Saumya Alvarez MD  [facility]     SUBJECTIVE:   CC: Andreia is an 62 year old who presents for preventive health visit.       Patient has been advised of split billing requirements and indicates understanding: Yes  Healthy Habits:     Getting at least 3 servings of Calcium per day:  Yes    Bi-annual eye exam:  Yes    Dental care twice a year:  Yes    Sleep apnea or symptoms of sleep apnea:  Sleep apnea    Diet:  Regular (no restrictions)    Frequency of exercise:  2-3 days/week    Duration of exercise:  30-45 minutes    Taking medications regularly:  No    Barriers to taking medications:  Not applicable    Medication side effects:  Other    PHQ-2 Total Score: 0    Additional concerns today:  Yes    Working form home - one of goals is to work in office one day a week. She works for  [employer]    Sleep apnea? - snoring but  has not noticed " "apnea.  Feeling rested a lot of days.  She has lost some weight and feels more energetic    BP Readings from Last 6 Encounters:   09/20/22 120/70   09/15/21 117/77   09/04/20 112/70   08/30/17 118/80   06/07/16 128/86   03/13/15 118/77     Cold sores - valtrex as needed - usually just happens in the summer - or eating nuts.  Allergy to peanuts - not really walnust or pecans     Recheck lipids - she would agree to taking medication if her LDL is elevated    Wrist aches -attributing to working out and having the wrong position -  lasted a long time .  Stopped working out - but lost muscle mass - - had some physical therapy  - didn't help.  Then picked up game again - yoga and pilates.  Last month exercises with a  at the [facility].  Both elbow, both wrists.  Uses arnica gel.  Topical anesthetic    Hallux rigidus - seeing Nhi Moses at the [facility] in [place]    Today's PHQ-2 Score:   PHQ-2 ( 1999 Pfizer) 9/13/2022   Q1: Little interest or pleasure in doing things 0   Q2: Feeling down, depressed or hopeless 0   PHQ-2 Score 0   PHQ-2 Total Score (12-17 Years)- Positive if 3 or more points; Administer PHQ-A if positive -   Q1: Little interest or pleasure in doing things Not at all   Q2: Feeling down, depressed or hopeless Not at all   PHQ-2 Score 0     \"I was going to come in and be depressed, but I'm not. Just got a dog - walking a lot.\"   James retired  Thinking about [facility] and moving to [place] - James's family is there    Abuse: Current or Past (Physical, Sexual or Emotional) - No  Do you feel safe in your environment? Yes        Social History     Tobacco Use     Smoking status: Never Smoker     Smokeless tobacco: Never Used   Substance Use Topics     Alcohol use: Yes     Alcohol/week: 0.0 standard drinks     Comment: occ     If you drink alcohol do you typically have >3 drinks per day or >7 drinks per week? No      Reviewed orders with patient.  Reviewed " health maintenance and updated orders accordingly - Yes      Breast Cancer Screening:    Breast CA Risk Assessment (FHS-7) 9/15/2021   Do you have a family history of breast, colon, or ovarian cancer? No / Unknown         History of abnormal Pap smear: NO - age 30-65 PAP every 5 years with negative HPV co-testing recommended  PAP / HPV Latest Ref Rng & Units 9/4/2020 5/24/2019 8/30/2017   PAP Negative for squamous intraepithelial lesion or malignancy. Negative for squamous intraepithelial lesion or malignancy  Electronically signed by Fabienne Barillas CT (ASCP) on 9/16/2020 at 11:33 AM   Unsatisfactory for evaluation  Electronically signed by Fabienne Barillas CT (ASCP) on 6/3/2019 at 10:55 AM   -   PAP (Historical) - - - NIL   HPV16 NEG Negative Negative Negative   HPV18 NEG Negative Negative Negative   HRHPV NEG Negative Negative Negative     Reviewed and updated as needed this visit by clinical staff   Tobacco  Allergies  Meds                Reviewed and updated as needed this visit by Provider                       Review of Systems   Constitutional: Negative for chills and fever.   HENT: Negative for congestion, ear pain, hearing loss and sore throat.    Eyes Negative for pain.   Respiratory: Negative for cough and shortness of breath.    Cardiovascular: Negative for chest pain, palpitations and peripheral edema.   Gastrointestinal: Negative for abdominal pain, constipation, diarrhea, heartburn, hematochezia and nausea.   Breasts:  Negative for tenderness, breast mass and discharge.   Genitourinary: Negative for dysuria, frequency, genital sores, hematuria, pelvic pain, urgency, vaginal bleeding and vaginal discharge.   Musculoskeletal: Positive for arthralgias. Negative for joint swelling and myalgias.   Skin: Negative for rash.   Neurological: Positive for weakness and paresthesias. Negative for dizziness and headaches.   Psychiatric/Behavioral: Positive for mood changes. The patient is not  "nervous/anxious.           OBJECTIVE:   /70 (BP Location: Left arm, Patient Position: Sitting, Cuff Size: Adult Regular)   Pulse 62   Ht 1.67 m (5' 5.75\")   Wt 70.8 kg (156 lb)   SpO2 98%   BMI 25.37 kg/m    Physical Exam  General appearance - alert, well appearing, and in no distress  Mental status - normal mood, behavior, speech, dress, motor activity, and thought processes  Eyes - pupils equal and reactive, extraocular eye movements intact, funduscopic exam normal, discs flat and sharp  Ears - bilateral TM's and external ear canals normal  Mouth - mucous membranes moist, pharynx normal without lesions  Neck - supple, no significant adenopathy, carotids upstroke normal bilaterally, no bruits, thyroid exam: thyroid is normal in size without nodules or tenderness  Chest - clear to auscultation, no wheezes, rales or rhonchi, symmetric air entry  Heart - normal rate, regular rhythm, normal S1, S2, no murmurs, rubs, clicks or gallops  Abdomen - soft, nontender, nondistended, no masses or organomegaly  Breasts - breasts appear normal, no suspicious masses, no skin or nipple changes or axillary nodes  Neurological - alert, oriented, normal speech, no focal findings or movement disorder noted, DTR's normal and symmetric  Extremities - peripheral pulses normal, no pedal edema, no clubbing or cyanosis  Skin - no rashes or worrisome lesions      "

## 2022-09-22 ENCOUNTER — ANCILLARY PROCEDURE (OUTPATIENT)
Dept: BONE DENSITY | Facility: CLINIC | Age: 62
End: 2022-09-22
Attending: FAMILY MEDICINE
Payer: COMMERCIAL

## 2022-09-22 DIAGNOSIS — M85.851 OSTEOPENIA OF BOTH HIPS: ICD-10-CM

## 2022-09-22 DIAGNOSIS — M85.852 OSTEOPENIA OF BOTH HIPS: ICD-10-CM

## 2022-09-22 DIAGNOSIS — M85.88 OSTEOPENIA OF LUMBAR SPINE: ICD-10-CM

## 2022-09-22 PROCEDURE — 77080 DXA BONE DENSITY AXIAL: CPT | Performed by: INTERNAL MEDICINE

## 2022-09-23 ENCOUNTER — MYC MEDICAL ADVICE (OUTPATIENT)
Dept: FAMILY MEDICINE | Facility: CLINIC | Age: 62
End: 2022-09-23

## 2022-09-23 DIAGNOSIS — Z72.820 POOR SLEEP: ICD-10-CM

## 2022-09-23 DIAGNOSIS — R06.83 SNORING: Primary | ICD-10-CM

## 2022-09-27 NOTE — TELEPHONE ENCOUNTER
Pt is calling and requesting a referral to Dr Sam Monsalve-sleep dr with [facility]-please put in order for patient.

## 2022-10-07 ENCOUNTER — TELEPHONE (OUTPATIENT)
Dept: SLEEP MEDICINE | Facility: CLINIC | Age: 62
End: 2022-10-07

## 2022-10-07 NOTE — TELEPHONE ENCOUNTER
Reason for call:  Other   Patient called regarding (reason for call):   In person only with provider  Alexi Hurt    Additional comments:   Please add patient to [facility] waitlist if there is one.   She only wants to see provider In person only with provider  Alexi or Licha. No video. Otherwise, no need to call patient unless you have questions.    Phone number to reach patient:  Cell number on file:    Telephone Information:   Mobile [phone]       Best Time:  any    Can we leave a detailed message on this number?  YES    Travel screening: Not Applicable

## 2022-10-07 NOTE — TELEPHONE ENCOUNTER
Attempted to call patient. No answer. Patient was instructed that [place] location is not yet open. Patient was encouraged to call back to [facility]     Tara Coughlin RN on 10/7/2022 at 10:26 AM

## 2022-10-12 PROBLEM — M94.9 DISORDER OF BONE AND CARTILAGE: Status: ACTIVE | Noted: 2022-10-12

## 2022-10-12 PROBLEM — M53.3 COCCYDYNIA: Status: ACTIVE | Noted: 2022-10-12

## 2022-10-12 PROBLEM — M89.9 DISORDER OF BONE AND CARTILAGE: Status: ACTIVE | Noted: 2022-10-12

## 2022-10-12 PROBLEM — C44.90 MALIGNANT NEOPLASM OF SKIN: Status: ACTIVE | Noted: 2021-05-26

## 2022-11-11 ENCOUNTER — TRANSFERRED RECORDS (OUTPATIENT)
Dept: HEALTH INFORMATION MANAGEMENT | Facility: CLINIC | Age: 62
End: 2022-11-11

## 2022-11-20 ENCOUNTER — HEALTH MAINTENANCE LETTER (OUTPATIENT)
Age: 62
End: 2022-11-20

## 2022-12-28 ENCOUNTER — NURSE TRIAGE (OUTPATIENT)
Dept: NURSING | Facility: CLINIC | Age: 62
End: 2022-12-28

## 2022-12-28 NOTE — TELEPHONE ENCOUNTER
Patient had covid questions. No need to triage.     Clara Maria RN on 12/28/2022 at 11:03 AM

## 2023-01-29 ASSESSMENT — SLEEP AND FATIGUE QUESTIONNAIRES
HOW LIKELY ARE YOU TO NOD OFF OR FALL ASLEEP WHILE SITTING AND TALKING TO SOMEONE: WOULD NEVER DOZE
HOW LIKELY ARE YOU TO NOD OFF OR FALL ASLEEP WHILE SITTING QUIETLY AFTER LUNCH WITHOUT ALCOHOL: SLIGHT CHANCE OF DOZING
HOW LIKELY ARE YOU TO NOD OFF OR FALL ASLEEP WHILE LYING DOWN TO REST IN THE AFTERNOON WHEN CIRCUMSTANCES PERMIT: HIGH CHANCE OF DOZING
HOW LIKELY ARE YOU TO NOD OFF OR FALL ASLEEP WHILE SITTING AND READING: MODERATE CHANCE OF DOZING
HOW LIKELY ARE YOU TO NOD OFF OR FALL ASLEEP WHILE SITTING INACTIVE IN A PUBLIC PLACE: SLIGHT CHANCE OF DOZING
HOW LIKELY ARE YOU TO NOD OFF OR FALL ASLEEP IN A CAR, WHILE STOPPED FOR A FEW MINUTES IN TRAFFIC: WOULD NEVER DOZE
HOW LIKELY ARE YOU TO NOD OFF OR FALL ASLEEP WHEN YOU ARE A PASSENGER IN A CAR FOR AN HOUR WITHOUT A BREAK: HIGH CHANCE OF DOZING
HOW LIKELY ARE YOU TO NOD OFF OR FALL ASLEEP WHILE WATCHING TV: MODERATE CHANCE OF DOZING

## 2023-02-02 ENCOUNTER — OFFICE VISIT (OUTPATIENT)
Dept: SLEEP MEDICINE | Facility: CLINIC | Age: 63
End: 2023-02-02
Payer: COMMERCIAL

## 2023-02-02 VITALS
BODY MASS INDEX: 25.01 KG/M2 | OXYGEN SATURATION: 100 % | WEIGHT: 155.6 LBS | HEIGHT: 66 IN | RESPIRATION RATE: 16 BRPM | SYSTOLIC BLOOD PRESSURE: 135 MMHG | HEART RATE: 73 BPM | DIASTOLIC BLOOD PRESSURE: 82 MMHG

## 2023-02-02 DIAGNOSIS — R41.89 COGNITIVE CHANGES: ICD-10-CM

## 2023-02-02 DIAGNOSIS — R29.818 SUSPECTED SLEEP APNEA: Primary | ICD-10-CM

## 2023-02-02 DIAGNOSIS — G47.8 NON-RESTORATIVE SLEEP: ICD-10-CM

## 2023-02-02 DIAGNOSIS — R06.83 SNORING: ICD-10-CM

## 2023-02-02 PROCEDURE — 99204 OFFICE O/P NEW MOD 45 MIN: CPT | Performed by: INTERNAL MEDICINE

## 2023-02-02 NOTE — PROGRESS NOTES
Sleep Consultation:    Date on this visit: 2/2/2023    Andreia Jorgensen  is referred by Saumya Alvarez for a sleep consultation.     Primary Physician: Saumya Alvarez NEIDA Jorgensen reports frequent snoring and poor quality of sleep and excessive daytime fatigue for many years.     Patient is experiencing daytime dysfunction characterized by fatigue and cognitive changes, especially memory impairment.    Andreia does snore every night, and snoring can be heard outside the room. Patient's  does complain of snoring, snorting and poor quality of sleep. She does not have witnessed apneas.They occasionally sleep separately.  Patient sleeps on her back and side. She has occassional morning dry mouth and morning headaches, denies no restless legs. Andreia has occasional bruxism and denies any sleep walking, dream enactment, sleep paralysis and hypnogogic/hypnopompic hallucinations. She has rare episodes of night terrors.     Andreia goes to sleep at 9:00 PM during the week. She wakes up at 6:00 AM. She falls asleep in 5 minutes.  Andreia denies difficulty falling asleep.  She wakes up 1-2 times a night for 5 minutes before falling back to sleep.  Andreia wakes up to uncertain reasons, external stimuli and snort arousals.  On weekends, Andreia goes to sleep at 9:00 PM.  She wakes up at 7:00 AM. She falls asleep in 5 minutes.  Patient gets an average of 8 hours of sleep per night.     Andreia denies difficulty breathing through her nose.      Patient's [place] Sleepiness score 12/24 consistent with mild daytime sleepiness.      Andreia naps 1-2 times per week for 30-60 minutes, feels refreshed after naps. She takes no inadvertant naps.  She denies closing eyes, dozing and falling asleep while driving. Patient was counseled on the importance of driving while alert, to pull over if drowsy, or nap before getting into the vehicle if sleepy.      She uses 3 cups/day of coffee. Last caffeine intake is usually before  "noon.    Problem List:  Patient Active Problem List    Diagnosis Date Noted     Disorder of bone and cartilage 10/12/2022     Priority: Medium     Formatting of this note might be different from the original.  Created by Conversion    Replacement Utility updated for latest IMO load       Coccydynia 10/12/2022     Priority: Medium     Formatting of this note might be different from the original.  Created by Conversion       Sprain of right ankle 07/14/2022     Priority: Medium     Malignant neoplasm of skin 05/26/2021     Priority: Medium     Formatting of this note might be different from the original.  05/2021, left superior shoulder, BCC, ED&C 6/28/21 Marta Andrea PA-C       Snoring 10/28/2020     Priority: Medium     Lumbago 09/01/2017     Priority: Medium     Osteopenia 03/13/2015     Priority: Medium     Hypercholesterolemia 03/13/2015     Priority: Medium     Symptomatic menopausal or female climacteric states 03/13/2015     Priority: Medium     Social History     Tobacco Use     Smoking status: Never     Passive exposure: Never     Smokeless tobacco: Never   Vaping Use     Vaping Use: Never used   Substance Use Topics     Alcohol use: Yes     Alcohol/week: 0.0 standard drinks     Comment: occ     Drug use: No       Family History:  Family History   Problem Relation Age of Onset     Osteoporosis Mother      Breast Cancer Maternal Grandmother 69.00       Physical Examination:  Vitals: /82   Pulse 73   Resp 16   Ht 1.67 m (5' 5.75\")   Wt 70.6 kg (155 lb 9.6 oz)   SpO2 100%   BMI 25.31 kg/m    BMI= Body mass index is 25.31 kg/m .  GENERAL APPEARANCE: healthy, alert and no distress  HENT: oropharynx crowded  NEURO: mentation intact and speech normal  PSYCH: mentation appears normal and affect normal/bright  Mallampati Class: IV.  Tonsillar Stage: 1  hidden by pillars.    Impression/Plan:    1. Possible Obstructive sleep apnea   2.  Cognitive impairment    Patient is a 62 years old female with a BMI of " 25, who presents with a history of frequent loud snoring, poor sleep, and daytime dysfunction characterized by fatigue and cognitive changes.  Patient complains of having poor short-term memory.  She had a PSG as part of a research project in 2020.  Data from this test which is available in the chart indicates an apnea-hypopnea index of 8.8/h and RDI of 25/h.    We discussed pathophysiology of obstructive sleep apnea, consequences of untreated disease and management options in detail.  Considering her daytime impairment, treatment should be considered.  Patient does not want CPAP and is interested in an oral appliance.    At this time, my recommendation will be to perform an overnight sleep study to accurately evaluate degree of her sleep apnea to inform therapy options.      Plan:     1. Home sleep apnea testing     She will follow up with me in approximately two weeks after her sleep study has been competed to review the results and discuss plan of care.       Polysomnography & HST reviewed.  Limitations of HST reviewed  Obstructive sleep apnea reviewed.  Complications of untreated sleep apnea were reviewed.    I spent a total of 45 minutes for this appointment on this date of service which include time spent before, during and after the visit for chart review, patient care, counseling and coordination of care.    Dr. Emiliano Pina       CC: Saumay Alvarez

## 2023-02-02 NOTE — NURSING NOTE
"Chief Complaint   Patient presents with     Sleep Problem     Concerns with snoring and other sleep problems       Initial /82   Pulse 73   Resp 16   Ht 1.67 m (5' 5.75\")   Wt 70.6 kg (155 lb 9.6 oz)   SpO2 100%   BMI 25.31 kg/m   Estimated body mass index is 25.31 kg/m  as calculated from the following:    Height as of this encounter: 1.67 m (5' 5.75\").    Weight as of this encounter: 70.6 kg (155 lb 9.6 oz).    Medication Reconciliation: complete  ESS: 12  Neck circumference: 33 centimeters.  DME: N/A  Luna Begum CMA      "

## 2023-02-02 NOTE — NURSING NOTE
HST pick-up, HST drop-off, and follow-up appointment with provider have all been scheduled.  Luna Begum, CMA

## 2023-03-08 ENCOUNTER — OFFICE VISIT (OUTPATIENT)
Dept: SLEEP MEDICINE | Facility: CLINIC | Age: 63
End: 2023-03-08
Payer: COMMERCIAL

## 2023-03-08 DIAGNOSIS — R41.89 COGNITIVE CHANGES: ICD-10-CM

## 2023-03-08 DIAGNOSIS — R06.83 SNORING: ICD-10-CM

## 2023-03-08 DIAGNOSIS — G47.8 NON-RESTORATIVE SLEEP: ICD-10-CM

## 2023-03-08 DIAGNOSIS — R29.818 SUSPECTED SLEEP APNEA: ICD-10-CM

## 2023-03-08 PROCEDURE — G0399 HOME SLEEP TEST/TYPE 3 PORTA: HCPCS | Performed by: INTERNAL MEDICINE

## 2023-03-08 NOTE — PROGRESS NOTES
Pt is completing a home sleep test. Pt was instructed on how to put on the Noxturnal T3 device and associated equipment before going to bed and given the opportunity to practice putting it on before leaving the sleep center. Pt was reminded to bring the home sleep test kit back to the center tomorrow, at agreed upon time for download and reporting.   Neck circumference: 33 CM / 13 inches.

## 2023-03-09 ENCOUNTER — DOCUMENTATION ONLY (OUTPATIENT)
Dept: SLEEP MEDICINE | Facility: CLINIC | Age: 63
End: 2023-03-09
Payer: COMMERCIAL

## 2023-03-09 NOTE — PROGRESS NOTES
HST POST-STUDY QUESTIONNAIRE    1. What time did you go to bed?  9:43 pm  2. How long do you think it took to fall asleep?  53 mins  3. What time did you wake up to start the day?  6:00 am  4. Did you get up during the night at all?  No  5. If you woke up, do you remember approximately what time(s)?   6. Did you have any difficulty with the equipment?  No  7. Did you us any type of treatment with this study?  None  8. Was the head of the bed elevated? No  9. Did you sleep in a recliner?  No  10. Did you stop using CPAP at least 3 days before this test?  NA  11. Any other information you'd like us to know? Used a sleep meditation, body scan to guide me to relaxation and sound sleep.

## 2023-03-10 NOTE — PROGRESS NOTES
This HSAT was performed using a Noxturnal T3 device which recorded snore, sound, movement activity, body position, nasal pressure, oronasal thermal airflow, pulse, oximetry and both chest and abdominal respiratory effort. HSAT data was restricted to the time patient states they were in bed.     HSAT was scored using 1B 4% hypopnea rule.     HST AHI (Non-PAT): 1.6  Snoring was reported as none.  Time with SpO2 below 89% was 0.7 minutes.   Overall signal quality was good.     Pt will follow up with sleep provider to determine appropriate therapy.

## 2023-03-13 NOTE — PROCEDURES
"HOME SLEEP STUDY INTERPRETATION        Patient: Andreia Jorgensen  MRN: [id number]  YOB: 1960  Study Date: 3/8/2023  PCP/Referring Provider: Saumya Alvarez;   Ordering Provider: Emiliano Pina MD         Indications for Home Study: Andreia Jorgensen is a 62 year old female  who presents with symptoms suggestive of obstructive sleep apnea.    Estimated body mass index is 25.31 kg/m  as calculated from the following:    Height as of 2/2/23: 1.67 m (5' 5.75\").    Weight as of 2/2/23: 70.6 kg (155 lb 9.6 oz).  Total score - Kite: 12 (2/2/2023  3:00 PM)  STOP-BANG: 3/8        Data: A full night home sleep study was performed recording the standard physiologic parameters including body position, movement, sound, nasal pressure, thermal oral airflow, chest and abdominal movements with respiratory inductance plethysmography, and oxygen saturation by pulse oximetry. Pulse rate was estimated by oximetry recording. This study was considered adequate based on > 4 hours of quality oximetry and respiratory recording. As specified by the AASM Manual for the Scoring of Sleep and Associated events, version 2.3, Rule VIII.D 1B, 4% oxygen desaturation scoring for hypopneas is used as a standard of care on all home sleep apnea testing.        Analysis Time:  477 minutes        Respiration:   Sleep Associated Hypoxemia: sustained hypoxemia was not present. Baseline oxygen saturation was 95%.  Time with saturation less than or equal to 88% was 0.7 minutes. The lowest oxygen saturation was 84%.   Snoring: Snoring was present.  Respiratory events: The home study revealed a presence of 0 obstructive apneas and 2 mixed and central apneas. There were 11 hypopneas resulting in a combined apnea/hypopnea index [AHI] of 1.6 events per hour.  AHI was 1.8 per hour supine, N/A per hour prone, N/A per hour on left side, and 0.8 per hour on right side.   Pattern: Excluding events noted above, respiratory rate and pattern was " Normal.      Position: Percent of time spent: supine - 85%, prone - 0%, on left - 0%, on right - 14.9%.      Heart Rate: By pulse oximetry normal rate was noted.       Assessment:     Negative for clinically significant sleep apnea.    Sleep associated hypoxemia was not present.    Recommendations:    If clinical concern for sleep apnea remains due to history or exam findings, consider in lab PSG for assessment.    Suggest optimizing sleep hygiene and avoiding sleep deprivation.        Diagnosis Code(s): Snoring R06.83    Emiliano Pina MD, March 13, 2023   Diplomate, American Board of Psychiatry and Neurology, Sleep Medicine

## 2023-04-10 ENCOUNTER — OFFICE VISIT (OUTPATIENT)
Dept: INTERNAL MEDICINE | Facility: CLINIC | Age: 63
End: 2023-04-10
Payer: COMMERCIAL

## 2023-04-10 ENCOUNTER — NURSE TRIAGE (OUTPATIENT)
Dept: NURSING | Facility: CLINIC | Age: 63
End: 2023-04-10

## 2023-04-10 VITALS
TEMPERATURE: 98.6 F | OXYGEN SATURATION: 99 % | DIASTOLIC BLOOD PRESSURE: 77 MMHG | WEIGHT: 156.2 LBS | SYSTOLIC BLOOD PRESSURE: 120 MMHG | HEART RATE: 67 BPM | HEIGHT: 65 IN | RESPIRATION RATE: 16 BRPM | BODY MASS INDEX: 26.02 KG/M2

## 2023-04-10 DIAGNOSIS — S06.0X0A CONCUSSION WITHOUT LOSS OF CONSCIOUSNESS, INITIAL ENCOUNTER: ICD-10-CM
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
## 2023-04-10 DIAGNOSIS — R42 DIZZINESS: ICD-10-CM

## 2023-04-10 DIAGNOSIS — R53.83 FATIGUE, UNSPECIFIED TYPE: ICD-10-CM

## 2023-04-10 LAB
ALBUMIN SERPL BCG-MCNC: 4.5 G/DL (ref 3.5–5.2)
ALP SERPL-CCNC: 68 U/L (ref 35–104)
ALT SERPL W P-5'-P-CCNC: 24 U/L (ref 10–35)
ANION GAP SERPL CALCULATED.3IONS-SCNC: 12 MMOL/L (ref 7–15)
AST SERPL W P-5'-P-CCNC: 27 U/L (ref 10–35)
BASOPHILS # BLD AUTO: 0 10E3/UL (ref 0–0.2)
BASOPHILS NFR BLD AUTO: 1 %
BILIRUB SERPL-MCNC: 0.3 MG/DL
BUN SERPL-MCNC: 14.9 MG/DL (ref 8–23)
CALCIUM SERPL-MCNC: 9.6 MG/DL (ref 8.8–10.2)
CHLORIDE SERPL-SCNC: 104 MMOL/L (ref 98–107)
CREAT SERPL-MCNC: 0.8 MG/DL (ref 0.51–0.95)
DEPRECATED HCO3 PLAS-SCNC: 26 MMOL/L (ref 22–29)
EOSINOPHIL # BLD AUTO: 0.1 10E3/UL (ref 0–0.7)
EOSINOPHIL NFR BLD AUTO: 2 %
ERYTHROCYTE [DISTWIDTH] IN BLOOD BY AUTOMATED COUNT: 12 % (ref 10–15)
GFR SERPL CREATININE-BSD FRML MDRD: 82 ML/MIN/1.73M2
GLUCOSE SERPL-MCNC: 97 MG/DL (ref 70–99)
HCT VFR BLD AUTO: 42.3 % (ref 35–47)
HGB BLD-MCNC: 14.1 G/DL (ref 11.7–15.7)
IMM GRANULOCYTES # BLD: 0 10E3/UL
IMM GRANULOCYTES NFR BLD: 0 %
LYMPHOCYTES # BLD AUTO: 2.3 10E3/UL (ref 0.8–5.3)
LYMPHOCYTES NFR BLD AUTO: 32 %
MCH RBC QN AUTO: 31.7 PG (ref 26.5–33)
MCHC RBC AUTO-ENTMCNC: 33.3 G/DL (ref 31.5–36.5)
MCV RBC AUTO: 95 FL (ref 78–100)
MONOCYTES # BLD AUTO: 0.4 10E3/UL (ref 0–1.3)
MONOCYTES NFR BLD AUTO: 6 %
NEUTROPHILS # BLD AUTO: 4.3 10E3/UL (ref 1.6–8.3)
NEUTROPHILS NFR BLD AUTO: 59 %
PLATELET # BLD AUTO: 185 10E3/UL (ref 150–450)
POTASSIUM SERPL-SCNC: 5.1 MMOL/L (ref 3.4–5.3)
PROT SERPL-MCNC: 7.4 G/DL (ref 6.4–8.3)
RBC # BLD AUTO: 4.45 10E6/UL (ref 3.8–5.2)
SODIUM SERPL-SCNC: 142 MMOL/L (ref 136–145)
TSH SERPL DL<=0.005 MIU/L-ACNC: 1.77 UIU/ML (ref 0.3–4.2)
WBC # BLD AUTO: 7.2 10E3/UL (ref 4–11)

## 2023-04-10 PROCEDURE — 99213 OFFICE O/P EST LOW 20 MIN: CPT | Performed by: INTERNAL MEDICINE

## 2023-04-10 PROCEDURE — 80050 GENERAL HEALTH PANEL: CPT | Performed by: INTERNAL MEDICINE

## 2023-04-10 PROCEDURE — 36415 COLL VENOUS BLD VENIPUNCTURE: CPT | Performed by: INTERNAL MEDICINE

## 2023-04-10 NOTE — PATIENT INSTRUCTIONS
Make sure to get enough sleep, hydration, frequent breaks, limit screen time. Let me know if need a letter for 1/2 part work for a few weeks/medical leave/part time.    See ophthalmology.     3. Try concussion PT    4. If depressive symptoms are getting worse, let me know.     5. Follow f/unit(s) with Dr Alvarez in 1 month.

## 2023-04-10 NOTE — TELEPHONE ENCOUNTER
Provider Response to 2nd Level Triage Request    I have reviewed the RN documentation. My recommendation is:  Face To Face Visit. same day appt with me tomorrow as I am not in office today, or same day appt with another provider at [facility] or elsewhere if available today

## 2023-04-10 NOTE — TELEPHONE ENCOUNTER
Nurse Triage SBAR    Is this a 2nd Level Triage? YES, LICENSED PRACTITIONER REVIEW IS REQUIRED    Situation: Multiple falls    Background: Patient states she has had 3 mechanical falls in the past month. All related to slipping on the ice or the dog getting under her feet. States the last fall was on Monday, April 3rd and she hit her head. She did not seek medical attention after this.     She calls today because she has been feeling dizzy and states that her eyes have been bothering her while looking at the computer. She reports a tilting sensation when she closes her eyes.  She denies headache, denies neck pain. Denies LOC at the time of fall. Denies any weakness and numbness at the time of fall and now.       Assessment: Dizziness    Protocol Recommended Disposition:   Go To Office Now    Recommendation:     Patient would like to be seen today if possible. Please contact her with any further recommendations.      Routed to provider     JORDYN OSMAN RN      Does the patient meet one of the following criteria for ADS visit consideration? 16+ years old, with an [facility] PCP     TIP  Providers, please consider if this condition is appropriate for management at one of our Acute and Diagnostic Services sites.     If patient is a good candidate, please use dotphrase <dot>triageresponse and select Refer to ADS to document.    Reason for Disposition    Spinning or tilting sensation (vertigo) present now    Additional Information    Negative: SEVERE difficulty breathing (e.g., struggling for each breath, speaks in single words)    Negative: Shock suspected (e.g., cold/pale/clammy skin, too weak to stand, low BP, rapid pulse)    Negative: Difficult to awaken or acting confused (e.g., disoriented, slurred speech)    Negative: Fainted, and still feels dizzy afterwards    Negative: Overdose (accidental or intentional) of medications    Negative: New neurologic deficit that is present now: * Weakness of the face, arm, or  leg on one side of the body * Numbness of the face, arm, or leg on one side of the body * Loss of speech or garbled speech    Negative: Heart beating < 50 beats per minute OR > 140 beats per minute    Negative: Sounds like a life-threatening emergency to the triager    Negative: Chest pain    Negative: Rectal bleeding, bloody stool, or tarry-black stool    Negative: Vomiting is main symptom    Negative: Diarrhea is main symptom    Negative: Headache is main symptom    Negative: Heat exhaustion suspected (i.e., dehydration from heat exposure)    Negative: Patient states that they are having an anxiety or panic attack    Negative: Dizziness from low blood sugar (i.e., < 60 mg/dl or 3.5 mmol/l)    Negative: SEVERE dizziness (e.g., unable to stand, requires support to walk, feels like passing out now)    Negative: SEVERE headache or neck pain    Negative: Spinning or tilting sensation (vertigo) present now and one or more stroke risk factors (i.e., hypertension, diabetes mellitus, prior stroke/TIA, heart attack, age over 60) (Exception: prior physician evaluation for this AND no different/worse than usual)    Negative: Neurologic deficit that was brief (now gone), ANY of the following:* Weakness of the face, arm, or leg on one side of the body* Numbness of the face, arm, or leg on one side of the body* Loss of speech or garbled speech    Negative: Loss of vision or double vision  (Exception: Similar to previous migraines.)    Negative: Extra heart beats OR irregular heart beating (i.e., 'palpitations')    Negative: Difficulty breathing    Negative: Drinking very little and has signs of dehydration (e.g., no urine > 12 hours, very dry mouth, very lightheaded)    Negative: Follows bleeding (e.g., stomach, rectum, vagina)  (Exception: Became dizzy from sight of small amount blood.)    Negative: Patient sounds very sick or weak to the triager    Negative: Lightheadedness (dizziness) present now, after 2 hours of rest and  fluids    Protocols used: DIZZINESS-A-OH

## 2023-04-10 NOTE — PROGRESS NOTES
"  Assessment & Plan     Fall, initial encounter  3 mechanical falls in the last month due to slipping on ice and tripping.  1 fall a week ago resulting in concussion.  - Physical Therapy Referral; Future    Concussion without loss of consciousness, initial encounter  Mild concussion without loss of consciousness.  Current symptoms improve fatigue, mild dizziness, difficulty with concentration, feeling down.  She plans to take some time off work for a few days.  Discussed possibility for a medical leave for working part-time, she will let me know based on how she is feeling after she takes her vacation.  Meanwhile can try concussion physical therapy.  - Physical Therapy Referral; Future    Fatigue, unspecified type  We will update labs.  Discussed that sleep is very important.  - Physical Therapy Referral; Future  - CBC with platelets and differential  - Comprehensive metabolic panel  - TSH with free T4 reflex    Dizziness  Most likely slight vertigo.  Neurological exam is normal.  - Physical Therapy Referral; Future     BMI:   Estimated body mass index is 25.99 kg/m  as calculated from the following:    Height as of this encounter: 1.651 m (5' 5\").    Weight as of this encounter: 70.9 kg (156 lb 3.2 oz).     Narcisa Mackenzie MD  [facility]    Subjective   Andreia is a 62 year old, presenting for the following health issues:  Dizziness and Recheck Medication (Pt reports that she has noticed being dizzy after a fall that happened last Monday. Pt also reports that she feels fatigue, loss of breath at times,just not her normal self.)    Andreia is a healthy 62-year-old female who is currently here for acute visit.  Due to icing her dog getting in the way she has fallen 3 times in the last month.  All falls were mechanical.  Most recent fall a week ago resulted in falling backwards and hitting her head.  She denies any loss of consciousness.  She fell on the grass.  Currently however she " "continues to have some fatigue, mild dizziness, difficulty with concentration.  She is a  and spends a lot of time on the computer, it has been slightly challenging to work full-time recently although she works from home and is able to take breaks..  Currently she denies any headaches or nausea.  She does have tenderness to palpation in her right posterior occiput but no lump palpated.  Occasionally she has intermittent blurry vision but no persistent changes in vision or flashing.  No numbness or weakness anywhere.  Sleep has been intermittent because her  moves a lot in bed at night.  She also feels somewhat unstable afraid to fall again.        4/10/2023     2:28 PM   Additional Questions   Roomed by minnie   Accompanied by alone     History of Present Illness       Reason for visit:  Head injury sustained in fall.  Symptom onset:  3-7 days ago  Symptoms include:  Light headed.  Symptom intensity:  Mild  Symptom progression:  Staying the same  Had these symptoms before:  No  What makes it worse:  Moving quickly.  What makes it better:  Rest    She eats 2-3 servings of fruits and vegetables daily.She consumes 0 sweetened beverage(s) daily.She exercises with enough effort to increase her heart rate 10 to 19 minutes per day.  She exercises with enough effort to increase her heart rate 3 or less days per week.   She is taking medications regularly.       Pt reports that she has been feeling dizzy and fatigue for about a week , and also reports that she hasn'tt been feeling like herself.        Review of Systems   As above      Objective    /77 (BP Location: Left arm, Patient Position: Sitting, Cuff Size: Adult Regular)   Pulse 67   Temp 98.6  F (37  C) (Tympanic)   Resp 16   Ht 1.651 m (5' 5\")   Wt 70.9 kg (156 lb 3.2 oz)   LMP  (LMP Unknown)   SpO2 99%   BMI 25.99 kg/m    Body mass index is 25.99 kg/m .  Physical Exam   General: well appearing female, alert and oriented x3  EYES: " Eyelids, conjunctiva, and sclera were normal. Pupils were normal.   HEAD, EARS, NOSE, MOUTH, AND THROAT: no cervical LAD, no thyromegaly or nodules appreciated. TMs are visualized and normal, oropharynx is clear.  RESPIRATORY: respirations non labored, CTA bl, no wheezes, rales, no forced expiratory wheezing.  CARDIOVASCULAR: Heart rate and rhythm were normal. No murmurs, rubs,gallops. There was no peripheral edema.   GASTROINTESTINAL: Positive bowel sounds, abdomen is soft, non tender, non distended.     MUSCULOSKELETAL: Muscle mass was normal for age. No joint synovitis or deformity.  LYMPHATIC: There were no enlarged nodes palpable.  SKIN/HAIR/NAILS: Skin color was normal.  No rashes.  NEUROLOGIC: The patient was alert and oriented.  Speech was normal.  There is no facial asymmetry.  No nystagmus, cranial nerves are intact, normal lower and upper extremity strength and sensation.  PSYCHIATRIC:  Mood and affect were normal.

## 2023-04-20 ENCOUNTER — HOSPITAL ENCOUNTER (OUTPATIENT)
Dept: PHYSICAL THERAPY | Facility: CLINIC | Age: 63
Setting detail: THERAPIES SERIES
Discharge: HOME | End: 2023-04-20
Attending: INTERNAL MEDICINE
Payer: COMMERCIAL

## 2023-04-20 DIAGNOSIS — R42 DIZZINESS: ICD-10-CM
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
## 2023-04-20 DIAGNOSIS — S06.0X0A CONCUSSION WITHOUT LOSS OF CONSCIOUSNESS, INITIAL ENCOUNTER: ICD-10-CM

## 2023-04-20 DIAGNOSIS — R53.83 FATIGUE, UNSPECIFIED TYPE: ICD-10-CM

## 2023-04-20 PROCEDURE — 97161 PT EVAL LOW COMPLEX 20 MIN: CPT | Mod: GP | Performed by: PHYSICAL THERAPIST

## 2023-04-20 PROCEDURE — 97530 THERAPEUTIC ACTIVITIES: CPT | Mod: GP | Performed by: PHYSICAL THERAPIST

## 2023-04-20 NOTE — DISCHARGE INSTRUCTIONS
4/20/23    Change to lower brightness on phone/computer.    Due to mild issue with eye convergence- do 2 to 3 sets of 5 of  pencil pushups  daily.     Wear a hat or VISOR outside or in target.  To cut overhead light.     Did a re tie on shoes.      Walk but ask James to handle Alexsandra.  Exercise is good for concussion healing.    Light weights are fine.   Nustep is good.  Light bands are ok.   Hold off on training until you are a little better. Trainer in 4 to 6 weeks.     Take hourly or less computer breaks.  Between meetings.  Stand up walk around, get water.  Let eyes rest.      Ary PT

## 2023-04-25 ENCOUNTER — HOSPITAL ENCOUNTER (OUTPATIENT)
Dept: PHYSICAL THERAPY | Facility: CLINIC | Age: 63
Setting detail: THERAPIES SERIES
Discharge: HOME | End: 2023-04-25
Attending: INTERNAL MEDICINE
Payer: COMMERCIAL

## 2023-04-25 PROCEDURE — 97530 THERAPEUTIC ACTIVITIES: CPT | Mod: GP | Performed by: PHYSICAL THERAPIST

## 2023-04-25 NOTE — DISCHARGE INSTRUCTIONS
4/25/23    Maybe take your break with Alexsandra's break.  11 to 1130?     Dog's mat/bed- is their spot.  Treats, nelia. Train to go to that spot.   [facility]      Pencil pushup  x5;  then do it with mild head nod no for 5 reps.  Do this 2x/day .   standing is a little harder than sitting.     Memory strategies- use phone for timers.  Alarms.  Build in your breaks.  Get away from the screen during the break.      Make written LISTS.  Notebook.    (not on computer).     EAP work therapy.  Consider.  Regarding Anxiety and work sunsetting.    Nustep or elliptical at [facility].

## 2023-05-03 PROBLEM — S93.401A SPRAIN OF RIGHT ANKLE: Status: RESOLVED | Noted: 2022-07-14 | Resolved: 2023-05-03

## 2023-05-07 NOTE — PROGRESS NOTES
04/20/23 1000   Quick Adds   Quick Adds Vestibular Eval   General Information   Start of Care Date 04/20/23   Referring Physician QUIANA Mackenzie   Orders Evaluate and Treat as Indicated   Order Date 04/10/23   Medical Diagnosis concussion.  walking dog.  fell ice 3x.  wore tennis shoes once.  one trip pavement uneven. caught up in branches.  mix 1 yr old.  bending sl dizzy.  caffeine?  [place] on [employer]  [place]  felt [place].   Surgical/Medical history reviewed Yes   Pertinent history of current problem (include personal factors and/or comorbidities that impact the POC) sxs since fall on ice.   Pertinent Visual History  glasses   Prior level of function comment amb in community w/out device   Current Community Support Family/friend caregiver   Patient role/Employment history Employed   [facility]   Patient/Family Goals Statement reduce sxs.   General Information Comments here alone, drove. lives w/    Fall Risk Screen   Fall screen completed by PT   Have you fallen 2 or more times in the past year? Yes   Have you fallen and had an injury in the past year? Yes   Fall screen comments see above   Pain   Patient currently in pain Yes   Pain location 1/6 Headache   Cognitive Status Examination   Orientation orientation to person, place and time   Level of Consciousness alert   Personal Safety and Judgment intact   Memory intact   Integumentary   Integumentary No deficits were identified   Posture   Posture Forward head position   Range of Motion (ROM)   ROM Comment neck rom 80% of nl   Strength   Strength Comments wfls   Bed Mobility   Bed Mobility Comments indep   Transfer Skills   Transfer Comments indep   Gait Special Tests   Gait Special Tests FUNCTIONAL GAIT ASSESSMENT   Gait Special Tests Functional Gait Assessment Score out of 30   Score out of 30 24   Sensory Examination   Sensory Perception no deficits were identified   Coordination   Coordination no deficits were  identified   Muscle Tone   Muscle Tone no deficits were identified   Oculomotor Exam   Smooth Pursuit Normal   Saccades Normal   Convergence Testing Abnormal   Convergence Testing Comments 12 cm   Clinical Impression   Criteria for Skilled Therapeutic Interventions Met yes, treatment indicated   PT Diagnosis post concussion   Influenced by the following impairments pain, visual /convergence, imbalance   Functional limitations due to impairments sxs w/ all functional activ   Clinical Presentation Stable/Uncomplicated   Clinical Decision Making (Complexity) Low complexity   Therapy Frequency other (see comments)   Predicted Duration of Therapy Intervention (days/wks) up to 8x in 90 days   Risk & Benefits of therapy have been explained Yes   Patient, Family & other staff in agreement with plan of care Yes   Clinical Impression Comments 62 yo w/ mild concussion sxs but needs skilled PT   Education Assessment   Preferred Learning Style Demonstration;Listening   Barriers to Learning Visual   GOALS   PT Eval Goals 1;2;3   Goal 1   Goal Identifier sxs   Goal Description pt to score 5 or less on csa to show improved sxs   Target Date 06/19/23   Goal 2   Goal Identifier gait   Goal Description pt to show improved FGA to 27 or better for commumity amb   Target Date 06/19/23   Goal 3   Goal Identifier HEP   Goal Description pt to be indep w/ a HEP for wellness   Target Date 06/19/23   Total Evaluation Time   PT Eval, Low Complexity Minutes (16943) 22

## 2023-05-16 ENCOUNTER — OFFICE VISIT (OUTPATIENT)
Dept: SLEEP MEDICINE | Facility: CLINIC | Age: 63
End: 2023-05-16
Payer: COMMERCIAL

## 2023-05-16 VITALS
HEIGHT: 66 IN | HEART RATE: 69 BPM | DIASTOLIC BLOOD PRESSURE: 84 MMHG | WEIGHT: 155.8 LBS | BODY MASS INDEX: 25.04 KG/M2 | OXYGEN SATURATION: 99 % | SYSTOLIC BLOOD PRESSURE: 135 MMHG

## 2023-05-16 DIAGNOSIS — R06.83 SNORING: Primary | ICD-10-CM

## 2023-05-16 PROCEDURE — 99213 OFFICE O/P EST LOW 20 MIN: CPT | Performed by: INTERNAL MEDICINE

## 2023-05-16 NOTE — PROGRESS NOTES
Sleep Study Follow-Up Visit:    Date on this visit: 5/16/2023    Andreia Jorgensen comes in today for follow-up of her home sleep study done on 3/8/23 at the [facility] for possible sleep apnea.    Analysis Time:  477 minutes           Respiration:   Sleep Associated Hypoxemia: sustained hypoxemia was not present. Baseline oxygen saturation was 95%.  Time with saturation less than or equal to 88% was 0.7 minutes. The lowest oxygen saturation was 84%.   Snoring: Snoring was present.  Respiratory events: The home study revealed a presence of 0 obstructive apneas and 2 mixed and central apneas. There were 11 hypopneas resulting in a combined apnea/hypopnea index [AHI] of 1.6 events per hour.  AHI was 1.8 per hour supine, N/A per hour prone, N/A per hour on left side, and 0.8 per hour on right side.   Pattern: Excluding events noted above, respiratory rate and pattern was Normal.        Position: Percent of time spent: supine - 85%, prone - 0%, on left - 0%, on right - 14.9%.        Heart Rate: By pulse oximetry normal rate was noted.         Assessment:     Negative for clinically significant sleep apnea.    Sleep associated hypoxemia was not present.    These findings were reviewed with patient.     Past medical/surgical history, family history, social history, medications and allergies were reviewed.      Impression/Plan:    1.  Negative home sleep apnea test    Home sleep test result was reviewed in detail with the patient.  Overall we had a good recording with majority of the night in the supine position.  Test is negative for sleep apnea or hypoxemia.  Patient feels reassured by these results.  We again reviewed her sleep disturbance and daytime dysfunction.  Patient reports that she has been sleeping better and she attributes daytime problems to underlying psychosocial stressors.  Limitations of home sleep testing was reviewed.  If there are ongoing symptoms, I advised her to return to clinic  for further testing.    I spent a total of 15 minutes for this appointment on this date of service which include time spent before, during and after the visit for chart review, patient care, counseling and coordination of care.    Dr. Emiliano Pina    CC: Saumya Alvarez

## 2023-05-16 NOTE — NURSING NOTE
"Chief Complaint   Patient presents with     Study Results     HST results       Initial /84   Pulse 69   Ht 1.67 m (5' 5.75\")   Wt 70.7 kg (155 lb 12.8 oz)   LMP  (LMP Unknown)   SpO2 99%   BMI 25.34 kg/m   Estimated body mass index is 25.34 kg/m  as calculated from the following:    Height as of this encounter: 1.67 m (5' 5.75\").    Weight as of this encounter: 70.7 kg (155 lb 12.8 oz).    Medication Reconciliation: complete  ESS 12  Myah Abbott MA  "

## 2023-07-24 DIAGNOSIS — Z86.19 H/O COLD SORES: ICD-10-CM

## 2023-07-24 RX ORDER — VALACYCLOVIR HYDROCHLORIDE 500 MG/1
500 TABLET, FILM COATED ORAL 2 TIMES DAILY
Qty: 18 TABLET | Refills: 0 | Status: SHIPPED | OUTPATIENT
Start: 2023-07-24 | End: 2023-09-22

## 2023-07-24 NOTE — TELEPHONE ENCOUNTER
"Routing refill request to provider for review/approval because:  The prescription has an end date of 9/23/2022  Last Written Prescription Date:  9/20/2022  Last Fill Quantity: 18,  # refills: 2   Last office visit provider:  4/10/2023     Requested Prescriptions   Pending Prescriptions Disp Refills    valACYclovir (VALTREX) 500 MG tablet 18 tablet 2     Sig: Take 1 tablet (500 mg) by mouth 2 times daily       Antivirals for Herpes Protocol Passed - 7/24/2023  8:50 AM        Passed - Patient is age 12 or older        Passed - Recent (12 mo) or future (30 days) visit within the authorizing provider's specialty     Patient has had an office visit with the authorizing provider or a provider within the authorizing providers department within the previous 12 mos or has a future within next 30 days. See \"Patient Info\" tab in inbasket, or \"Choose Columns\" in Meds & Orders section of the refill encounter.              Passed - Medication is active on med list        Passed - Normal serum creatinine on file in past 12 months     Recent Labs   Lab Test 04/10/23  1516   CR 0.80       Ok to refill medication if creatinine is low               Elena Langston, RN 07/24/23 11:35 AM  "

## 2023-08-08 ENCOUNTER — PATIENT OUTREACH (OUTPATIENT)
Dept: CARE COORDINATION | Facility: CLINIC | Age: 63
End: 2023-08-08
Payer: COMMERCIAL

## 2023-09-08 ENCOUNTER — ANCILLARY PROCEDURE (OUTPATIENT)
Dept: MAMMOGRAPHY | Facility: CLINIC | Age: 63
End: 2023-09-08
Attending: FAMILY MEDICINE
Payer: COMMERCIAL

## 2023-09-08 DIAGNOSIS — Z12.31 VISIT FOR SCREENING MAMMOGRAM: ICD-10-CM

## 2023-09-08 PROCEDURE — 77063 BREAST TOMOSYNTHESIS BI: CPT | Mod: TC | Performed by: RADIOLOGY

## 2023-09-08 PROCEDURE — 77067 SCR MAMMO BI INCL CAD: CPT | Mod: TC | Performed by: RADIOLOGY

## 2023-09-22 ENCOUNTER — OFFICE VISIT (OUTPATIENT)
Dept: FAMILY MEDICINE | Facility: CLINIC | Age: 63
End: 2023-09-22
Payer: COMMERCIAL

## 2023-09-22 VITALS
BODY MASS INDEX: 25.99 KG/M2 | DIASTOLIC BLOOD PRESSURE: 68 MMHG | OXYGEN SATURATION: 98 % | WEIGHT: 156 LBS | HEART RATE: 62 BPM | RESPIRATION RATE: 13 BRPM | TEMPERATURE: 97.6 F | SYSTOLIC BLOOD PRESSURE: 118 MMHG | HEIGHT: 65 IN

## 2023-09-22 DIAGNOSIS — Z00.00 ROUTINE GENERAL MEDICAL EXAMINATION AT A HEALTH CARE FACILITY: Primary | ICD-10-CM

## 2023-09-22 DIAGNOSIS — Z13.220 SCREENING, LIPID: ICD-10-CM

## 2023-09-22 DIAGNOSIS — Z86.19 H/O COLD SORES: ICD-10-CM

## 2023-09-22 LAB
CHOLEST SERPL-MCNC: 266 MG/DL
HDLC SERPL-MCNC: 56 MG/DL
LDLC SERPL CALC-MCNC: 179 MG/DL
NONHDLC SERPL-MCNC: 210 MG/DL
TRIGL SERPL-MCNC: 155 MG/DL

## 2023-09-22 PROCEDURE — 36415 COLL VENOUS BLD VENIPUNCTURE: CPT | Performed by: FAMILY MEDICINE

## 2023-09-22 PROCEDURE — 99396 PREV VISIT EST AGE 40-64: CPT | Mod: 25 | Performed by: FAMILY MEDICINE

## 2023-09-22 PROCEDURE — 80061 LIPID PANEL: CPT | Performed by: FAMILY MEDICINE

## 2023-09-22 PROCEDURE — 90471 IMMUNIZATION ADMIN: CPT | Performed by: FAMILY MEDICINE

## 2023-09-22 PROCEDURE — 90682 RIV4 VACC RECOMBINANT DNA IM: CPT | Performed by: FAMILY MEDICINE

## 2023-09-22 RX ORDER — VALACYCLOVIR HYDROCHLORIDE 500 MG/1
500 TABLET, FILM COATED ORAL 2 TIMES DAILY
Qty: 18 TABLET | Refills: 2 | Status: SHIPPED | OUTPATIENT
Start: 2023-09-22 | End: 2024-09-27

## 2023-09-22 ASSESSMENT — ENCOUNTER SYMPTOMS
HEARTBURN: 1
BREAST MASS: 0
PALPITATIONS: 0
HEADACHES: 1
CONSTIPATION: 0
CHILLS: 0
NAUSEA: 0
ABDOMINAL PAIN: 0
COUGH: 0
EYE PAIN: 0
PARESTHESIAS: 0
NERVOUS/ANXIOUS: 0
HEMATOCHEZIA: 0
DIARRHEA: 0
MYALGIAS: 1
ARTHRALGIAS: 1
WEAKNESS: 0
FEVER: 0
SHORTNESS OF BREATH: 0
FREQUENCY: 0
JOINT SWELLING: 0
HEMATURIA: 0
SORE THROAT: 0
DIZZINESS: 0
DYSURIA: 0

## 2023-09-22 NOTE — PROGRESS NOTES
"  ASSESSMENT/PLAN:   Andreia was seen today for physical and imm/inj.    Diagnoses and all orders for this visit:    Routine general medical examination at a health care facility    H/O cold sores  Takes occasionally, not often enough to merit lab monitoring with comprehensive metabolic panel    -     valACYclovir (VALTREX) 500 MG tablet; Take 1 tablet (500 mg) by mouth 2 times daily    Screening, lipid  -     Lipid panel reflex to direct LDL Fasting; Future  -     Lipid panel reflex to direct LDL Fasting    Other orders  -     REVIEW OF HEALTH MAINTENANCE PROTOCOL ORDERS  -     INFLUENZA VACCINE 18-64Y (FLUBLOK)    Return in about 1 year (around 9/22/2024) for Routine preventive, or earlier as needed.    COUNSELING:  Reviewed preventive health counseling, as reflected in patient instructions      BMI:   Estimated body mass index is 25.96 kg/m  as calculated from the following:    Height as of this encounter: 1.651 m (5' 5\").    Weight as of this encounter: 70.8 kg (156 lb).         She reports that she has never smoked. She has never been exposed to tobacco smoke. She has never used smokeless tobacco.    Saumya Alvarez MD  [facility]   SUBJECTIVE:   CC: Andreia is an 63 year old who presents for preventive health visit.       9/22/2023     7:53 AM   Additional Questions   Roomed by Liz EPPERSON CMA       Healthy Habits:     Getting at least 3 servings of Calcium per day:  Yes    Bi-annual eye exam:  Yes    Dental care twice a year:  Yes    Sleep apnea or symptoms of sleep apnea:  None    Diet:  Regular (no restrictions)    Frequency of exercise:  2-3 days/week    Duration of exercise:  15-30 minutes    Taking medications regularly:  Yes    Medication side effects:  Not applicable    Additional concerns today:  No    Andreia begins: \"This summer started a more plant based diet - I finally decided I just needed to do it.\" She also joined a club in [place] called [employer] which includes a class " for older people    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  Will be retiring early next year    She has had some falls this year and want to recap: - within 5 weeks she fell three times around March, all around dog, and icy sidewalks and yard- once was running in the winter with my dog, once was irregular sidewalk, third time was just in the backyard over the sheet of ice created by snow and rain. She fell  on her side the first two times, the last time went straight back - concussion without LOSS OF CONSCIOUSNESS - saw Dr. Mackenzie and got PHYSICAL THERAPY    Alexsandra the hound mix dog gets 4 walks a day - 20 minutes to half hour and dog park every other day    She has always had grippy things for her shoes - but didn't put them on the days she had the fall    ---  Will send copy of living will    Social History     Tobacco Use    Smoking status: Never     Passive exposure: Never    Smokeless tobacco: Never   Substance Use Topics    Alcohol use: Yes     Alcohol/week: 0.0 standard drinks of alcohol     Comment: occ             2023     7:17 AM   Alcohol Use   Prescreen: >3 drinks/day or >7 drinks/week? No     Reviewed orders with patient.  Reviewed health maintenance and updated orders accordingly -       Breast Cancer Screenin/15/2021     7:44 AM   Breast CA Risk Assessment (FHS-7)   Do you have a family history of breast, colon, or ovarian cancer? No / Unknown         Pertinent mammograms are reviewed under the imaging tab.    History of abnormal Pap smear: NO - age 30-65 PAP every 5 years with negative HPV co-testing recommended      Latest Ref Rng & Units 2020    10:00 AM 2019     9:08 AM 2017     8:20 AM   PAP / HPV   PAP Negative for squamous intraepithelial lesion or malignancy. Negative for squamous intraepithelial lesion or malignancy  Electronically signed by Fabienne Barillas CT (ASCP) on 2020 at 11:33 AM    Unsatisfactory for  "evaluation  Electronically signed by Fabienne Barillas CT (ASCP) on 6/3/2019 at 10:55 AM       PAP (Historical)    NIL    HPV 16 DNA NEG Negative  Negative  Negative    HPV 18 DNA NEG Negative  Negative  Negative    Other HR HPV NEG Negative  Negative  Negative      Reviewed and updated as needed this visit by clinical staff   Tobacco  Allergies  Meds              Reviewed and updated as needed this visit by Provider                     Review of Systems   Constitutional:  Negative for chills and fever.   HENT:  Negative for congestion, ear pain, hearing loss and sore throat.    Eyes:  Positive for visual disturbance. Negative for pain.   Respiratory:  Negative for cough and shortness of breath.    Cardiovascular:  Positive for chest pain. Negative for palpitations and peripheral edema.   Gastrointestinal:  Positive for heartburn. Negative for abdominal pain, constipation, diarrhea, hematochezia and nausea.   Breasts:  Negative for tenderness, breast mass and discharge.   Genitourinary:  Negative for dysuria, frequency, genital sores, hematuria, pelvic pain, urgency, vaginal bleeding and vaginal discharge.   Musculoskeletal:  Positive for arthralgias and myalgias. Negative for joint swelling.   Skin:  Negative for rash.   Neurological:  Positive for headaches. Negative for dizziness, weakness and paresthesias.   Psychiatric/Behavioral:  Negative for mood changes. The patient is not nervous/anxious.      Chest pain : \"It's really more posture (chest pain). I've been having something there of years and now doing more push ups. \" Pain is in center of sternum. She does NOT  have heart burn    Headaches - \"I'm pretty intensely on the computer.\"  Also working out and doing weights above head. Has always had sort of headache that shows up in different places. Takes Aleve    Muscle and joint pain from working out       OBJECTIVE:   /68 (BP Location: Left arm, Patient Position: Sitting, Cuff Size: Adult " "Regular)   Pulse 62   Temp 97.6  F (36.4  C) (Tympanic)   Resp 13   Ht 1.651 m (5' 5\")   Wt 70.8 kg (156 lb)   LMP  (LMP Unknown)   SpO2 98%   Breastfeeding No   BMI 25.96 kg/m    Physical Exam  General appearance - alert, well appearing, and in no distress  Mental status - normal mood, behavior, speech, dress, motor activity, and thought processes  Eyes - pupils equal and reactive, extraocular eye movements intact, funduscopic exam normal, discs flat and sharp  Ears - bilateral TM's and external ear canals normal  Mouth - mucous membranes moist, pharynx normal without lesions  Neck - supple, no significant adenopathy, carotids upstroke normal bilaterally, no bruits, thyroid exam: thyroid is normal in size without nodules or tenderness  Chest - clear to auscultation, no wheezes, rales or rhonchi, symmetric air entry  Heart - normal rate, regular rhythm, normal S1, S2, no murmurs, rubs, clicks or gallops  Abdomen - soft, nontender, nondistended, no masses or organomegaly  Breasts - breasts appear normal, no suspicious masses, no skin or nipple changes or axillary nodes  Neurological - alert, oriented, normal speech, no focal findings or movement disorder noted, DTR's normal and symmetric  Extremities - peripheral pulses normal, no pedal edema, no clubbing or cyanosis  Skin - no rashes or worrisome lesions    "

## 2023-11-29 ENCOUNTER — LAB REQUISITION (OUTPATIENT)
Dept: LAB | Facility: CLINIC | Age: 63
End: 2023-11-29
Payer: COMMERCIAL

## 2023-11-29 DIAGNOSIS — D49.2 NEOPLASM OF UNSPECIFIED BEHAVIOR OF BONE, SOFT TISSUE, AND SKIN: ICD-10-CM

## 2023-11-29 DIAGNOSIS — D48.5 NEOPLASM OF UNCERTAIN BEHAVIOR OF SKIN: ICD-10-CM

## 2023-11-29 PROCEDURE — 88305 TISSUE EXAM BY PATHOLOGIST: CPT | Mod: TC,ORL | Performed by: DERMATOLOGY

## 2023-11-29 PROCEDURE — 88305 TISSUE EXAM BY PATHOLOGIST: CPT | Mod: 26 | Performed by: DERMATOLOGY

## 2023-12-01 LAB
PATH REPORT.COMMENTS IMP SPEC: ABNORMAL
PATH REPORT.COMMENTS IMP SPEC: ABNORMAL
PATH REPORT.COMMENTS IMP SPEC: YES
PATH REPORT.FINAL DX SPEC: ABNORMAL
PATH REPORT.GROSS SPEC: ABNORMAL
PATH REPORT.MICROSCOPIC SPEC OTHER STN: ABNORMAL
PATH REPORT.RELEVANT HX SPEC: ABNORMAL

## 2023-12-13 ENCOUNTER — TRANSFERRED RECORDS (OUTPATIENT)
Dept: HEALTH INFORMATION MANAGEMENT | Facility: CLINIC | Age: 63
End: 2023-12-13
Payer: COMMERCIAL

## 2024-01-18 ENCOUNTER — OFFICE VISIT (OUTPATIENT)
Dept: FAMILY MEDICINE | Facility: CLINIC | Age: 64
End: 2024-01-18
Payer: COMMERCIAL

## 2024-01-18 VITALS
SYSTOLIC BLOOD PRESSURE: 139 MMHG | TEMPERATURE: 97.5 F | HEART RATE: 80 BPM | DIASTOLIC BLOOD PRESSURE: 83 MMHG | BODY MASS INDEX: 26.49 KG/M2 | HEIGHT: 65 IN | OXYGEN SATURATION: 99 % | WEIGHT: 159 LBS | RESPIRATION RATE: 21 BRPM

## 2024-01-18 DIAGNOSIS — N95.1 SYMPTOMATIC MENOPAUSAL OR FEMALE CLIMACTERIC STATES: ICD-10-CM

## 2024-01-18 DIAGNOSIS — C44.311 BASAL CELL CARCINOMA (BCC) OF SKIN OF NOSE: ICD-10-CM

## 2024-01-18 DIAGNOSIS — Z01.818 PREOP GENERAL PHYSICAL EXAM: Primary | ICD-10-CM

## 2024-01-18 PROBLEM — H40.033 NARROW ANGLE GLAUCOMA SUSPECT OF BOTH EYES: Status: ACTIVE | Noted: 2024-01-18

## 2024-01-18 PROBLEM — M53.3 COCCYDYNIA: Status: RESOLVED | Noted: 2022-10-12 | Resolved: 2024-01-18

## 2024-01-18 PROBLEM — H25.89 OTHER AGE-RELATED CATARACT: Status: ACTIVE | Noted: 2024-01-18

## 2024-01-18 PROCEDURE — 90678 RSV VACC PREF BIVALENT IM: CPT | Performed by: FAMILY MEDICINE

## 2024-01-18 PROCEDURE — 90471 IMMUNIZATION ADMIN: CPT | Performed by: FAMILY MEDICINE

## 2024-01-18 PROCEDURE — 99213 OFFICE O/P EST LOW 20 MIN: CPT | Mod: 25 | Performed by: FAMILY MEDICINE

## 2024-01-18 RX ORDER — ESTRADIOL 10 UG/1
10 INSERT VAGINAL
Qty: 24 TABLET | Refills: 3 | Status: SHIPPED | OUTPATIENT
Start: 2024-01-18

## 2024-01-18 ASSESSMENT — PAIN SCALES - GENERAL: PAINLEVEL: MODERATE PAIN (4)

## 2024-01-18 NOTE — PROGRESS NOTES
Preoperative Evaluation  [facility]  [address] [place] [address]  Phone: [phone]  Fax: [phone]  Primary Provider: Melissa Alvarez  Pre-op Performing Provider: MELISSA ALVAREZ 18, 2024       Andreia is a 63 year old, presenting for the following:  Pre-Op Exam (Pre op -Procedures 01/24/24 and 01/25/24 - [facility] [place] - Ocular plastic surgery fax [phone] time TBD)        1/18/2024     2:50 PM   Additional Questions   Roomed by Chaparrita KOENIG     Surgical Information  Surgery/Procedure:Procedures 01/24/24 and 01/25/24 - [facility] - Ocular plastic surgery fax [phone] time TBD  [facility]  Surgeon: Dr. Christian  Surgery Date: 1/24 and 1/25  Time of Surgery: TBD  Where patient plans to recover: At home alone  Fax number for surgical facility: [phone]    Assessment & Plan     The proposed surgical procedure is considered INTERMEDIATE risk.    Preop general physical exam    Basal cell carcinoma (BCC) of skin of nose  Reason for surgery    Symptomatic menopausal or female climacteric states  Trail of  - estradiol (VAGIFEM) 10 MCG TABS vaginal tablet  Dispense: 24 tablet; Refill: 3      Antiplatelet or Anticoagulation Medication Instructions   - aspirin: Discontinue aspirin 7-10 days prior to procedure to reduce bleeding risk. It should be resumed postoperatively.     Additional Medication Instructions  Patient is to take all scheduled medications on the day of surgery    Recommendation  APPROVAL GIVEN to proceed with proposed procedure, without further diagnostic evaluation.      Subjective       HPI related to upcoming procedure: current basal cell left side up upper nose noted by dermatologist  Dr. Linette Downey.Will first go for Mohs' surgery. Then followed by plastic surgery - the latter is the reason for this preop    Andreia started seeing dermatologist regularly 3-4  years ago . She has had a basal cell cancer on shoulder as well - had a red scar form that.        1/15/2024     8:39 AM   Preop Questions   1. Have you ever had a heart attack or stroke? No   2. Have you ever had surgery on your heart or blood vessels, such as a stent placement, a coronary artery bypass, or surgery on an artery in your head, neck, heart, or legs? No   3. Do you have chest pain with activity? No   4. Do you have a history of  heart failure? No   5. Do you currently have a cold, bronchitis or symptoms of other infection? No   6. Do you have a cough, shortness of breath, or wheezing? No   7. Do you or anyone in your family have previous history of blood clots? No   8. Do you or does anyone in your family have a serious bleeding problem such as prolonged bleeding following surgeries or cuts? No   9. Have you ever had problems with anemia or been told to take iron pills? No   10. Have you had any abnormal blood loss such as black, tarry or bloody stools, or abnormal vaginal bleeding? No   11. Have you ever had a blood transfusion? No   12. Are you willing to have a blood transfusion if it is medically needed before, during, or after your surgery? Yes   13. Have you or any of your relatives ever had problems with anesthesia? No   14. Do you have sleep apnea, excessive snoring or daytime drowsiness? No   15. Do you have any artifical heart valves or other implanted medical devices like a pacemaker, defibrillator, or continuous glucose monitor? No   16. Do you have artificial joints? No   17. Are you allergic to latex? No         Health Care Directive  Patient does not have a Health Care Directive or Living Will: Patient states has Advance Directive and will bring in a copy to clinic.    Preoperative Review of    reviewed - no record of controlled substances prescribed.      Status of  other current and Chronic Conditions:    Still gets a herpes outbreak once a month on buttocks    Left knee injury  in December - no clear cause of injury, but one day could not walk with no obvious precedent. Went to Urgent care- xray showed no problem, prescription for prednisone.  It was great then had no pain in left knee. Then Alexsandra the dog ran into her knee - Alexsandra was hurtling toward a tennis all, impacted her knee Andreia's left knee - taking a while to improve. Works out twice a week and doesn't want to stop. She did a little stretching and it helps. 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  Created by Conversion    Replacement Utility updated for latest IMO load      Malignant neoplasm of skin 05/26/2021     Priority: Medium     Formatting of this note might be different from the original.  05/2021, left superior shoulder, BCC, ED&C 6/28/21 Marta Andrea PA-C      Snoring 10/28/2020     Priority: Medium    Lumbago 09/01/2017     Priority: Medium    Osteopenia 03/13/2015     Priority: Medium    Hypercholesterolemia 03/13/2015     Priority: Medium    Symptomatic menopausal or female climacteric states 03/13/2015     Priority: Medium      Past Medical History:   Diagnosis Date    Adenomatous polyps     colon    Coccydynia     Formatting of this note might be different from the original.  Created by Conversion    Concussion without loss of consciousness 03/2023    Dense breast     Low bone mass     Osteopenia     Vitamin D deficiency      Past Surgical History:   Procedure Laterality Date    COLONOSCOPY  11/03/2014    Colon normal; Repeat in 5 years    EYE SURGERY  spring 2017    PTK and refractive  "    Current Outpatient Medications   Medication Sig Dispense Refill    Cholecalciferol (VITAMIN D-3) 5000 UNITS TABS Take by mouth daily      Cyanocobalamin (VITAMIN B-12) 5000 MCG SUBL       estradiol (VAGIFEM) 10 MCG TABS vaginal tablet Place 1 tablet (10 mcg) vaginally twice a week Use daily for the first 14 days 24 tablet 3    Lysine 500 MG CAPS Take by mouth daily      NONFORMULARY \"Natural Calm\"; magnesium supplement drink      valACYclovir (VALTREX) 500 MG tablet Take 1 tablet (500 mg) by mouth 2 times daily 18 tablet 2       Allergies   Allergen Reactions    Penicillins Anaphylaxis    Amoxicillin Hives    Nuts Hives    Tomato Muscle Pain (Myalgia)        Social History     Tobacco Use    Smoking status: Never     Passive exposure: Never    Smokeless tobacco: Never   Substance Use Topics    Alcohol use: Yes     Alcohol/week: 0.0 standard drinks of alcohol     Comment: occ     Family History   Problem Relation Age of Onset    Osteoporosis Mother     Alzheimer Disease Mother     Heart Surgery Father     Alzheimer Disease Father     Bipolar Disorder Sister     Osteoarthritis Sister     Cholelithiasis Sister     Diabetes Type 2  Brother     Breast Cancer Maternal Grandmother 69     History   Drug Use No         Review of Systems  Constitutional: negative for recent illness or change in weight  Eyes: negative for irritation and vision change  Ears, nose, mouth, throat, and face: negative for nasal congestion and sore throat  Respiratory: negative for cough and dyspnea on exertion  Cardiovascular: negative for chest pain and palpitations  Gastrointestinal: negative for abdominal pain and change in bowel habits  Genitourinary:negative for dysuria, frequency and hesitancy; has vaginal dryness  Integument/breast: negative for rash  Hematologic/lymphatic: negative for bleeding and easy bruising  Neurological: negative for dizziness, headaches and paresthesia      Objective    /83 (BP Location: Left arm, Patient " "Position: Sitting, Cuff Size: Adult Large)   Pulse 80   Temp 97.5  F (36.4  C) (Tympanic)   Resp 21   Ht 1.651 m (5' 5\")   Wt 72.1 kg (159 lb)   LMP  (LMP Unknown)   SpO2 99%   BMI 26.46 kg/m     Estimated body mass index is 26.46 kg/m  as calculated from the following:    Height as of this encounter: 1.651 m (5' 5\").    Weight as of this encounter: 72.1 kg (159 lb).  Physical Exam  General appearance - alert, well appearing, and in no distress  Mental status - normal mood, behavior, speech, dress, motor activity, and thought processes  Eyes - pupils equal and reactive, extraocular eye movements intact, funduscopic exam normal, discs flat and sharp  Ears - bilateral TM's and external ear canals normal  Mouth - mucous membranes moist, pharynx normal without lesions  Neck - supple, no significant adenopathy, carotids upstroke normal bilaterally, no bruits, thyroid exam: thyroid is normal in size without nodules or tenderness  Chest - clear to auscultation, no wheezes, rales or rhonchi, symmetric air entry  Heart - normal rate, regular rhythm, normal S1, S2, no murmurs, rubs, clicks or gallops  Abdomen - soft, nontender, nondistended, no masses or organomegaly  Neurological - alert, oriented, normal speech, no focal findings or movement disorder noted, DTR's normal and symmetric  Extremities - peripheral pulses normal, no pedal edema, no clubbing or cyanosis  Skin - no rashes or worrisome lesions      Recent Labs   Lab Test 04/10/23  1516   HGB 14.1         POTASSIUM 5.1   CR 0.80      BP Readings from Last 6 Encounters:   01/18/24 139/83   09/22/23 118/68   05/16/23 135/84   04/10/23 120/77   02/02/23 135/82   09/20/22 120/70       Diagnostics  No labs were ordered during this visit.   No EKG required for low risk surgery (cataract, skin procedure, breast biopsy, etc).  No EKG required, no history of coronary heart disease, significant arrhythmia, peripheral arterial disease or other structural " heart disease.    Revised Cardiac Risk Index (RCRI)  The patient has the following serious cardiovascular risks for perioperative complications:   - No serious cardiac risks = 0 points     RCRI Interpretation: 0 points: Class I (very low risk - 0.4% complication rate)         Signed Electronically by: Saumya Alvarez MD  Copy of this evaluation report is provided to requesting physician.

## 2024-01-19 RX ORDER — ESTRADIOL 10 UG/1
INSERT VAGINAL
Qty: 44 TABLET | OUTPATIENT
Start: 2024-01-19

## 2024-04-02 ENCOUNTER — LAB REQUISITION (OUTPATIENT)
Dept: LAB | Facility: CLINIC | Age: 64
End: 2024-04-02
Payer: MEDICARE

## 2024-04-02 DIAGNOSIS — D48.5 NEOPLASM OF UNCERTAIN BEHAVIOR OF SKIN: ICD-10-CM

## 2024-04-02 PROCEDURE — 88341 IMHCHEM/IMCYTCHM EA ADD ANTB: CPT | Mod: TC,ORL | Performed by: DERMATOLOGY

## 2024-04-02 PROCEDURE — 88341 IMHCHEM/IMCYTCHM EA ADD ANTB: CPT | Mod: 26 | Performed by: DERMATOLOGY

## 2024-04-02 PROCEDURE — 88342 IMHCHEM/IMCYTCHM 1ST ANTB: CPT | Mod: 26 | Performed by: DERMATOLOGY

## 2024-04-02 PROCEDURE — 88305 TISSUE EXAM BY PATHOLOGIST: CPT | Mod: 26 | Performed by: DERMATOLOGY

## 2024-04-08 LAB
PATH REPORT.COMMENTS IMP SPEC: NORMAL
PATH REPORT.FINAL DX SPEC: NORMAL
PATH REPORT.GROSS SPEC: NORMAL
PATH REPORT.MICROSCOPIC SPEC OTHER STN: NORMAL
PATH REPORT.RELEVANT HX SPEC: NORMAL

## 2024-05-02 ENCOUNTER — MYC MEDICAL ADVICE (OUTPATIENT)
Dept: FAMILY MEDICINE | Facility: CLINIC | Age: 64
End: 2024-05-02
Payer: MEDICARE

## 2024-05-02 DIAGNOSIS — M85.88 OSTEOPENIA OF SPINE: ICD-10-CM

## 2024-05-02 DIAGNOSIS — M89.9 DISORDER OF BONE AND CARTILAGE: ICD-10-CM

## 2024-05-02 DIAGNOSIS — M94.9 DISORDER OF BONE AND CARTILAGE: ICD-10-CM

## 2024-05-02 DIAGNOSIS — M85.88 OSTEOPENIA OF LUMBAR SPINE: ICD-10-CM

## 2024-05-02 DIAGNOSIS — Z78.0 MENOPAUSE: ICD-10-CM

## 2024-05-02 DIAGNOSIS — Z12.11 ENCOUNTER FOR SCREENING COLONOSCOPY: Primary | ICD-10-CM

## 2024-05-02 DIAGNOSIS — M85.851 OSTEOPENIA OF BOTH HIPS: ICD-10-CM

## 2024-05-02 DIAGNOSIS — M85.852 OSTEOPENIA OF BOTH HIPS: ICD-10-CM

## 2024-05-02 NOTE — TELEPHONE ENCOUNTER
Last colonoscopy 11/4/19 - recheck  x 5 years (Due 11/2024)  Last DXA 9/22/22 - recheck x 2 years (Due after 9/22/24)     Orders pended to review and sign if agreeable for request.

## 2024-05-23 ENCOUNTER — LAB REQUISITION (OUTPATIENT)
Dept: LAB | Facility: CLINIC | Age: 64
End: 2024-05-23
Payer: MEDICARE

## 2024-05-23 DIAGNOSIS — D22.71 MELANOCYTIC NEVI OF RIGHT LOWER LIMB, INCLUDING HIP: ICD-10-CM

## 2024-05-23 PROCEDURE — 88305 TISSUE EXAM BY PATHOLOGIST: CPT | Mod: TC,ORL | Performed by: DERMATOLOGY

## 2024-05-23 PROCEDURE — 88305 TISSUE EXAM BY PATHOLOGIST: CPT | Mod: 26 | Performed by: DERMATOLOGY

## 2024-05-28 LAB
PATH REPORT.COMMENTS IMP SPEC: NORMAL
PATH REPORT.COMMENTS IMP SPEC: NORMAL
PATH REPORT.FINAL DX SPEC: NORMAL
PATH REPORT.GROSS SPEC: NORMAL
PATH REPORT.MICROSCOPIC SPEC OTHER STN: NORMAL
PATH REPORT.RELEVANT HX SPEC: NORMAL

## 2024-06-24 ENCOUNTER — MYC MEDICAL ADVICE (OUTPATIENT)
Dept: FAMILY MEDICINE | Facility: CLINIC | Age: 64
End: 2024-06-24
Payer: MEDICARE

## 2024-06-24 DIAGNOSIS — Z12.11 ENCOUNTER FOR SCREENING COLONOSCOPY: Primary | ICD-10-CM

## 2024-09-27 DIAGNOSIS — Z86.19 H/O COLD SORES: ICD-10-CM

## 2024-09-27 RX ORDER — VALACYCLOVIR HYDROCHLORIDE 500 MG/1
500 TABLET, FILM COATED ORAL 2 TIMES DAILY
Qty: 18 TABLET | Refills: 2 | Status: SHIPPED | OUTPATIENT
Start: 2024-09-27

## 2024-10-01 ENCOUNTER — ANCILLARY PROCEDURE (OUTPATIENT)
Dept: MAMMOGRAPHY | Facility: CLINIC | Age: 64
End: 2024-10-01
Attending: FAMILY MEDICINE
Payer: MEDICARE

## 2024-10-01 ENCOUNTER — ANCILLARY PROCEDURE (OUTPATIENT)
Dept: BONE DENSITY | Facility: CLINIC | Age: 64
End: 2024-10-01
Attending: FAMILY MEDICINE
Payer: MEDICARE

## 2024-10-01 DIAGNOSIS — M85.88 OSTEOPENIA OF LUMBAR SPINE: ICD-10-CM

## 2024-10-01 DIAGNOSIS — Z12.31 SCREENING MAMMOGRAM, ENCOUNTER FOR: ICD-10-CM

## 2024-10-01 DIAGNOSIS — M85.852 OSTEOPENIA OF BOTH HIPS: ICD-10-CM

## 2024-10-01 DIAGNOSIS — Z78.0 MENOPAUSE: ICD-10-CM

## 2024-10-01 DIAGNOSIS — M85.851 OSTEOPENIA OF BOTH HIPS: ICD-10-CM

## 2024-10-01 PROCEDURE — 77091 TBS TECHL CALCULATION ONLY: CPT | Performed by: PHYSICIAN ASSISTANT

## 2024-10-01 PROCEDURE — 77080 DXA BONE DENSITY AXIAL: CPT | Mod: TC | Performed by: PHYSICIAN ASSISTANT

## 2024-10-01 PROCEDURE — 77063 BREAST TOMOSYNTHESIS BI: CPT | Mod: TC | Performed by: RADIOLOGY

## 2024-10-01 PROCEDURE — 77067 SCR MAMMO BI INCL CAD: CPT | Mod: TC | Performed by: RADIOLOGY

## 2024-10-03 ENCOUNTER — TRANSFERRED RECORDS (OUTPATIENT)
Dept: HEALTH INFORMATION MANAGEMENT | Facility: CLINIC | Age: 64
End: 2024-10-03
Payer: MEDICARE

## 2024-10-07 SDOH — HEALTH STABILITY: PHYSICAL HEALTH: ON AVERAGE, HOW MANY DAYS PER WEEK DO YOU ENGAGE IN MODERATE TO STRENUOUS EXERCISE (LIKE A BRISK WALK)?: 3 DAYS

## 2024-10-07 SDOH — HEALTH STABILITY: PHYSICAL HEALTH: ON AVERAGE, HOW MANY MINUTES DO YOU ENGAGE IN EXERCISE AT THIS LEVEL?: 20 MIN

## 2024-10-07 ASSESSMENT — SOCIAL DETERMINANTS OF HEALTH (SDOH): HOW OFTEN DO YOU GET TOGETHER WITH FRIENDS OR RELATIVES?: ONCE A WEEK

## 2024-10-08 ENCOUNTER — ANCILLARY PROCEDURE (OUTPATIENT)
Dept: GENERAL RADIOLOGY | Facility: CLINIC | Age: 64
End: 2024-10-08
Attending: FAMILY MEDICINE
Payer: MEDICARE

## 2024-10-08 ENCOUNTER — OFFICE VISIT (OUTPATIENT)
Dept: FAMILY MEDICINE | Facility: CLINIC | Age: 64
End: 2024-10-08
Payer: MEDICARE

## 2024-10-08 VITALS
RESPIRATION RATE: 15 BRPM | HEIGHT: 66 IN | OXYGEN SATURATION: 99 % | WEIGHT: 157 LBS | DIASTOLIC BLOOD PRESSURE: 74 MMHG | SYSTOLIC BLOOD PRESSURE: 132 MMHG | TEMPERATURE: 98 F | BODY MASS INDEX: 25.23 KG/M2 | HEART RATE: 64 BPM

## 2024-10-08 DIAGNOSIS — M25.552 HIP PAIN, LEFT: ICD-10-CM

## 2024-10-08 DIAGNOSIS — E78.00 HYPERCHOLESTEROLEMIA: ICD-10-CM

## 2024-10-08 DIAGNOSIS — N94.10 DYSPAREUNIA, FEMALE: ICD-10-CM

## 2024-10-08 DIAGNOSIS — Z91.89 FRACTURE RISK ASSESSMENT SCORE (FRAX) INDICATING GREATER THAN 20% RISK FOR MAJOR OSTEOPOROSIS-RELATED FRACTURE: ICD-10-CM

## 2024-10-08 DIAGNOSIS — R06.83 SNORING: ICD-10-CM

## 2024-10-08 DIAGNOSIS — Z00.00 WELCOME TO MEDICARE PREVENTIVE VISIT: Primary | ICD-10-CM

## 2024-10-08 DIAGNOSIS — M25.551 HIP PAIN, RIGHT: ICD-10-CM

## 2024-10-08 DIAGNOSIS — M85.89 OSTEOPENIA OF MULTIPLE SITES: ICD-10-CM

## 2024-10-08 DIAGNOSIS — Z23 NEED FOR TDAP VACCINATION: ICD-10-CM

## 2024-10-08 DIAGNOSIS — N89.8 VAGINAL DRYNESS: ICD-10-CM

## 2024-10-08 PROBLEM — M54.50 LUMBAGO: Status: RESOLVED | Noted: 2017-09-01 | Resolved: 2024-10-08

## 2024-10-08 LAB
ANION GAP SERPL CALCULATED.3IONS-SCNC: 12 MMOL/L (ref 7–15)
BUN SERPL-MCNC: 16.7 MG/DL (ref 8–23)
CALCIUM SERPL-MCNC: 9.5 MG/DL (ref 8.8–10.4)
CHLORIDE SERPL-SCNC: 104 MMOL/L (ref 98–107)
CHOLEST SERPL-MCNC: 268 MG/DL
CREAT SERPL-MCNC: 0.79 MG/DL (ref 0.51–0.95)
EGFRCR SERPLBLD CKD-EPI 2021: 83 ML/MIN/1.73M2
FASTING STATUS PATIENT QL REPORTED: YES
FASTING STATUS PATIENT QL REPORTED: YES
GLUCOSE SERPL-MCNC: 90 MG/DL (ref 70–99)
HCO3 SERPL-SCNC: 26 MMOL/L (ref 22–29)
HDLC SERPL-MCNC: 63 MG/DL
LDLC SERPL CALC-MCNC: 181 MG/DL
NONHDLC SERPL-MCNC: 205 MG/DL
POTASSIUM SERPL-SCNC: 5.4 MMOL/L (ref 3.4–5.3)
SODIUM SERPL-SCNC: 142 MMOL/L (ref 135–145)
TRIGL SERPL-MCNC: 121 MG/DL
TSH SERPL DL<=0.005 MIU/L-ACNC: 3.42 UIU/ML (ref 0.3–4.2)
VIT D+METAB SERPL-MCNC: 46 NG/ML (ref 20–50)

## 2024-10-08 PROCEDURE — 90480 ADMN SARSCOV2 VAC 1/ONLY CMP: CPT | Performed by: FAMILY MEDICINE

## 2024-10-08 PROCEDURE — 84443 ASSAY THYROID STIM HORMONE: CPT | Performed by: FAMILY MEDICINE

## 2024-10-08 PROCEDURE — 91320 SARSCV2 VAC 30MCG TRS-SUC IM: CPT | Performed by: FAMILY MEDICINE

## 2024-10-08 PROCEDURE — 80048 BASIC METABOLIC PNL TOTAL CA: CPT | Performed by: FAMILY MEDICINE

## 2024-10-08 PROCEDURE — G0402 INITIAL PREVENTIVE EXAM: HCPCS | Performed by: FAMILY MEDICINE

## 2024-10-08 PROCEDURE — G0009 ADMIN PNEUMOCOCCAL VACCINE: HCPCS | Performed by: FAMILY MEDICINE

## 2024-10-08 PROCEDURE — 82306 VITAMIN D 25 HYDROXY: CPT | Performed by: FAMILY MEDICINE

## 2024-10-08 PROCEDURE — G0008 ADMIN INFLUENZA VIRUS VAC: HCPCS | Performed by: FAMILY MEDICINE

## 2024-10-08 PROCEDURE — 73502 X-RAY EXAM HIP UNI 2-3 VIEWS: CPT | Mod: TC | Performed by: INTERNAL MEDICINE

## 2024-10-08 PROCEDURE — 80061 LIPID PANEL: CPT | Performed by: FAMILY MEDICINE

## 2024-10-08 PROCEDURE — 99214 OFFICE O/P EST MOD 30 MIN: CPT | Mod: 25 | Performed by: FAMILY MEDICINE

## 2024-10-08 PROCEDURE — 36415 COLL VENOUS BLD VENIPUNCTURE: CPT | Performed by: FAMILY MEDICINE

## 2024-10-08 PROCEDURE — 90662 IIV NO PRSV INCREASED AG IM: CPT | Performed by: FAMILY MEDICINE

## 2024-10-08 PROCEDURE — 90677 PCV20 VACCINE IM: CPT | Performed by: FAMILY MEDICINE

## 2024-10-08 RX ORDER — ESTRADIOL 10 UG/1
10 INSERT VAGINAL
Qty: 24 TABLET | Refills: 3 | Status: SHIPPED | OUTPATIENT
Start: 2024-10-10

## 2024-10-08 NOTE — PATIENT INSTRUCTIONS
"No palm oil in peanut butter  No coconut oil  No trans fats  High fiver - nuts, legumes(peas, beans,) , seeds  Look at alternate butter substitutes like \"Benecol\"    You are due for your tetanus vaccine but this would only be covered at your pharmacy    Remember to upload a copy of your living will to [employer]  Patient Education   Preventive Care Advice   This is general advice given by our system to help you stay healthy. However, your care team may have specific advice just for you. Please talk to your care team about your preventive care needs.  Nutrition  Eat 5 or more servings of fruits and vegetables each day.  Try wheat bread, brown rice and whole grain pasta (instead of white bread, rice, and pasta).  Get enough calcium and vitamin D. Check the label on foods and aim for 100% of the RDA (recommended daily allowance).  Lifestyle  Exercise at least 150 minutes each week  (30 minutes a day, 5 days a week).  Do muscle strengthening activities 2 days a week. These help control your weight and prevent disease.  No smoking.  Wear sunscreen to prevent skin cancer.  Have a dental exam and cleaning every 6 months.  Yearly exams  See your health care team every year to talk about:  Any changes in your health.  Any medicines your care team has prescribed.  Preventive care, family planning, and ways to prevent chronic diseases.  Shots (vaccines)   HPV shots (up to age 26), if you've never had them before.  Hepatitis B shots (up to age 59), if you've never had them before.  COVID-19 shot: Get this shot when it's due.  Flu shot: Get a flu shot every year.  Tetanus shot: Get a tetanus shot every 10 years.  Pneumococcal, hepatitis A, and RSV shots: Ask your care team if you need these based on your risk.  Shingles shot (for age 50 and up)  General health tests  Diabetes screening:  Starting at age 35, Get screened for diabetes at least every 3 years.  If you are younger than age 35, ask your care team if you should be " screened for diabetes.  Cholesterol test: At age 39, start having a cholesterol test every 5 years, or more often if advised.  Bone density scan (DEXA): At age 50, ask your care team if you should have this scan for osteoporosis (brittle bones).  Hepatitis C: Get tested at least once in your life.  STIs (sexually transmitted infections)  Before age 24: Ask your care team if you should be screened for STIs.  After age 24: Get screened for STIs if you're at risk. You are at risk for STIs (including HIV) if:  You are sexually active with more than one person.  You don't use condoms every time.  You or a partner was diagnosed with a sexually transmitted infection.  If you are at risk for HIV, ask about PrEP medicine to prevent HIV.  Get tested for HIV at least once in your life, whether you are at risk for HIV or not.  Cancer screening tests  Cervical cancer screening: If you have a cervix, begin getting regular cervical cancer screening tests starting at age 21.  Breast cancer scan (mammogram): If you've ever had breasts, begin having regular mammograms starting at age 40. This is a scan to check for breast cancer.  Colon cancer screening: It is important to start screening for colon cancer at age 45.  Have a colonoscopy test every 10 years (or more often if you're at risk) Or, ask your provider about stool tests like a FIT test every year or Cologuard test every 3 years.  To learn more about your testing options, visit:   .  For help making a decision, visit:   https://bit.ly/bx91428.  Prostate cancer screening test: If you have a prostate, ask your care team if a prostate cancer screening test (PSA) at age 55 is right for you.  Lung cancer screening: If you are a current or former smoker ages 50 to 80, ask your care team if ongoing lung cancer screenings are right for you.  For informational purposes only. Not to replace the advice of your health care provider. Copyright   2023 [employer]. All rights  reserved. Clinically reviewed by the [facility] Transitions Program. [employer] [id number] - REV 01/24.  Preventing Falls: Care Instructions  Injuries and health problems such as trouble walking or poor eyesight can increase your risk of falling. So can some medicines. But there are things you can do to help prevent falls. You can exercise to get stronger. You can also arrange your home to make it safer.    Talk to your doctor about the medicines you take. Ask if any of them increase the risk of falls and whether they can be changed or stopped.   Try to exercise regularly. It can help improve your strength and balance. This can help lower your risk of falling.         Practice fall safety and prevention.   Wear low-heeled shoes that fit well and give your feet good support. Talk to your doctor if you have foot problems that make this hard.  Carry a cellphone or wear a medical alert device that you can use to call for help.  Use stepladders instead of chairs to reach high objects. Don't climb if you're at risk for falls. Ask for help, if needed.  Wear the correct eyeglasses, if you need them.        Make your home safer.   Remove rugs, cords, clutter, and furniture from walkways.  Keep your house well lit. Use night-lights in hallways and bathrooms.  Install and use sturdy handrails on stairways.  Wear nonskid footwear, even inside. Don't walk barefoot or in socks without shoes.        Be safe outside.   Use handrails, curb cuts, and ramps whenever possible.  Keep your hands free by using a shoulder bag or backpack.  Try to walk in well-lit areas. Watch out for uneven ground, changes in pavement, and debris.  Be careful in the winter. Walk on the grass or gravel when sidewalks are slippery. Use de-icer on steps and walkways. Add non-slip devices to shoes.    Put grab bars and nonskid mats in your shower or tub and near the toilet. Try to use a shower chair or bath bench when bathing.   Get into a tub or shower by  "putting in your weaker leg first. Get out with your strong side first. Have a phone or medical alert device in the bathroom with you.   Where can you learn more?  Go to https://www."[employer].net/patiented  Enter G117 in the search box to learn more about \"Preventing Falls: Care Instructions.\"  Current as of: July 17, 2023  Content Version: 14.2 2024 [employer].   Care instructions adapted under license by your healthcare professional. If you have questions about a medical condition or this instruction, always ask your healthcare professional. [employer] disclaims any warranty or liability for your use of this information.    Bladder Training: Care Instructions  Your Care Instructions     Bladder training is used to treat urge incontinence and stress incontinence. Urge incontinence means that the need to urinate comes on so fast that you can't get to a toilet in time. Stress incontinence means that you leak urine because of pressure on your bladder. For example, it may happen when you laugh, cough, or lift something heavy.  Bladder training can increase how long you can wait before you have to urinate. It can also help your bladder hold more urine. And it can give you better control over the urge to urinate.  It is important to remember that bladder training takes a few weeks to a few months to make a difference. You may not see results right away, but don't give up.  Follow-up care is a key part of your treatment and safety. Be sure to make and go to all appointments, and call your doctor if you are having problems. It's also a good idea to know your test results and keep a list of the medicines you take.  How can you care for yourself at home?  Work with your doctor to come up with a bladder training program that is right for you. You may use one or more of the following methods.  Delayed urination  In the beginning, try to keep from urinating for 5 minutes after you first feel the " "need to go.  While you wait, take deep, slow breaths to relax. Kegel exercises can also help you delay the need to go to the bathroom.  After some practice, when you can easily wait 5 minutes to urinate, try to wait 10 minutes before you urinate.  Slowly increase the waiting period until you are able to control when you have to urinate.  Scheduled urination  Empty your bladder when you first wake up in the morning.  Schedule times throughout the day when you will urinate.  Start by going to the bathroom every hour, even if you don't need to go.  Slowly increase the time between trips to the bathroom.  When you have found a schedule that works well for you, keep doing it.  If you wake up during the night and have to urinate, do it. Apply your schedule to waking hours only.  Kegel exercises  These tighten and strengthen pelvic muscles, which can help you control the flow of urine. (If doing these exercises causes pain, stop doing them and talk with your doctor.) To do Kegel exercises:  Squeeze your muscles as if you were trying not to pass gas. Or squeeze your muscles as if you were stopping the flow of urine. Your belly, legs, and buttocks shouldn't move.  Hold the squeeze for 3 seconds, then relax for 5 to 10 seconds.  Start with 3 seconds, then add 1 second each week until you are able to squeeze for 10 seconds.  Repeat the exercise 10 times a session. Do 3 to 8 sessions a day.  When should you call for help?  Watch closely for changes in your health, and be sure to contact your doctor if:    Your incontinence is getting worse.     You do not get better as expected.   Where can you learn more?  Go to https://www.[employer].net/patiented  Enter V684 in the search box to learn more about \"Bladder Training: Care Instructions.\"  Current as of: November 15, 2023  Content Version: 14.2 2024 [employer].   Care instructions adapted under license by your healthcare professional. If you have questions about a " medical condition or this instruction, always ask your healthcare professional. [employer] disclaims any warranty or liability for your use of this information.
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
## 2024-10-09 ENCOUNTER — TELEPHONE (OUTPATIENT)
Dept: FAMILY MEDICINE | Facility: CLINIC | Age: 64
End: 2024-10-09
Payer: MEDICARE

## 2024-10-09 NOTE — TELEPHONE ENCOUNTER
"10/9/24 3:57pm  Called Dr. 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   Dr. Mc clarified this was a dictation error and there is no cancer. Dr. Mc clarified that there is pincer acetabular morphology bilaterally and patient was called and notified.    IMPRESSION: No acute fracture or subluxation. Joint spaces are preserved. 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 Calcification along the superior right acetabulum.     Patient calls regarding x-ray results from yesterday, 10/9/24. Patient reviewed on [facility]. Patient concerned with impression stating \"There is cancer acetabular morphology bilaterally.\" Patient would like provider review and recommendations on next steps. Patient would like to know if this indicates she has cancer.     Informed patient that her question/concern would be sent to Dr. Alvarez's covering provider, Ann Suárez. Patient verbalized understanding.   "

## 2024-10-10 ENCOUNTER — MYC MEDICAL ADVICE (OUTPATIENT)
Dept: FAMILY MEDICINE | Facility: CLINIC | Age: 64
End: 2024-10-10
Payer: MEDICARE

## 2024-10-10 DIAGNOSIS — M25.551 HIP PAIN, RIGHT: Primary | ICD-10-CM

## 2024-10-10 DIAGNOSIS — M25.552 HIP PAIN, LEFT: ICD-10-CM

## 2024-10-10 DIAGNOSIS — M25.551 BILATERAL HIP PAIN: ICD-10-CM

## 2024-10-10 DIAGNOSIS — M25.552 BILATERAL HIP PAIN: ICD-10-CM

## 2024-10-10 DIAGNOSIS — E78.00 ELEVATED LDL CHOLESTEROL LEVEL: Primary | ICD-10-CM

## 2024-10-10 DIAGNOSIS — Z82.49 FAMILY HISTORY OF ISCHEMIC HEART DISEASE: ICD-10-CM

## 2024-10-10 PROBLEM — K63.5 HYPERPLASTIC COLON POLYP: Status: ACTIVE | Noted: 2024-10-10

## 2024-10-11 NOTE — TELEPHONE ENCOUNTER
Provider please review follow up response to recent hip xrays.     Ortho referral pended to review, edit if needed and sign.

## 2024-10-23 ENCOUNTER — TRANSFERRED RECORDS (OUTPATIENT)
Dept: HEALTH INFORMATION MANAGEMENT | Facility: CLINIC | Age: 64
End: 2024-10-23
Payer: MEDICARE

## 2024-10-29 ENCOUNTER — TELEPHONE (OUTPATIENT)
Dept: FAMILY MEDICINE | Facility: CLINIC | Age: 64
End: 2024-10-29
Payer: MEDICARE

## 2024-10-29 NOTE — TELEPHONE ENCOUNTER
Patient calls asking for the procedure code for her CT calcium screening. Transferred to billing to see if they can assist.

## 2024-10-31 PROBLEM — M70.62 TROCHANTERIC BURSITIS OF BOTH HIPS: Status: ACTIVE | Noted: 2024-10-31

## 2024-10-31 PROBLEM — M70.61 TROCHANTERIC BURSITIS OF BOTH HIPS: Status: ACTIVE | Noted: 2024-10-31

## 2024-11-01 ENCOUNTER — HOSPITAL ENCOUNTER (OUTPATIENT)
Dept: CT IMAGING | Facility: CLINIC | Age: 64
Discharge: HOME | End: 2024-11-01
Attending: FAMILY MEDICINE | Admitting: FAMILY MEDICINE
Payer: MEDICARE

## 2024-11-01 DIAGNOSIS — E78.00 ELEVATED LDL CHOLESTEROL LEVEL: ICD-10-CM

## 2024-11-01 DIAGNOSIS — Z82.49 FAMILY HISTORY OF ISCHEMIC HEART DISEASE: ICD-10-CM

## 2024-11-01 LAB
CV CALCIUM SCORE AGATSTON LM: 0
CV CALCIUM SCORING AGATSON LAD: 0
CV CALCIUM SCORING AGATSTON CX: 0
CV CALCIUM SCORING AGATSTON RCA: 0
CV CALCIUM SCORING AGATSTON TOTAL: 0

## 2024-11-01 PROCEDURE — G1010 CDSM STANSON: HCPCS

## 2024-11-01 PROCEDURE — 75571 CT HRT W/O DYE W/CA TEST: CPT | Mod: 26 | Performed by: INTERNAL MEDICINE

## 2024-11-01 PROCEDURE — G1010 CDSM STANSON: HCPCS | Performed by: INTERNAL MEDICINE

## 2024-12-03 ENCOUNTER — TELEPHONE (OUTPATIENT)
Dept: FAMILY MEDICINE | Facility: CLINIC | Age: 64
End: 2024-12-03

## 2024-12-03 DIAGNOSIS — E87.5 HYPERKALEMIA: Primary | ICD-10-CM

## 2024-12-03 NOTE — TELEPHONE ENCOUNTER
Order/Referral Request    Who is requesting: Kim    Orders being requested: Potassium recheck    Reason service is needed/diagnosis: PCP stated in lab results (10/08) that she would like to recheck patients potassium levels in a month pt just now seeing that and wants to get this checked please create lab order      When are orders needed by: asap - lab appt on 12/09    Has this been discussed with Provider: Yes    Does patient have a preference on a Group/Provider/Facility? [facility]    Does patient have an appointment scheduled?: No    Where to send orders: Place orders within Epic    Could we send this information to you in [place] [place] or would you prefer to receive a phone call?:   Patient would prefer a phone call   Okay to leave a detailed message?: No at Cell number on file:    Telephone Information:   Mobile [phone]

## 2024-12-09 ENCOUNTER — LAB (OUTPATIENT)
Dept: LAB | Facility: CLINIC | Age: 64
End: 2024-12-09
Payer: MEDICARE

## 2024-12-09 DIAGNOSIS — E87.5 HYPERKALEMIA: ICD-10-CM

## 2024-12-09 LAB
ANION GAP SERPL CALCULATED.3IONS-SCNC: 9 MMOL/L (ref 7–15)
BUN SERPL-MCNC: 19.1 MG/DL (ref 8–23)
CALCIUM SERPL-MCNC: 9.2 MG/DL (ref 8.8–10.4)
CHLORIDE SERPL-SCNC: 105 MMOL/L (ref 98–107)
CREAT SERPL-MCNC: 0.96 MG/DL (ref 0.51–0.95)
EGFRCR SERPLBLD CKD-EPI 2021: 65 ML/MIN/1.73M2
GLUCOSE SERPL-MCNC: 81 MG/DL (ref 70–99)
HCO3 SERPL-SCNC: 28 MMOL/L (ref 22–29)
POTASSIUM SERPL-SCNC: 4.2 MMOL/L (ref 3.4–5.3)
SODIUM SERPL-SCNC: 142 MMOL/L (ref 135–145)

## 2024-12-09 PROCEDURE — 36415 COLL VENOUS BLD VENIPUNCTURE: CPT

## 2024-12-09 PROCEDURE — 80048 BASIC METABOLIC PNL TOTAL CA: CPT

## 2025-01-22 ENCOUNTER — MYC MEDICAL ADVICE (OUTPATIENT)
Dept: FAMILY MEDICINE | Facility: CLINIC | Age: 65
End: 2025-01-22
Payer: MEDICARE

## 2025-02-24 ENCOUNTER — TELEPHONE (OUTPATIENT)
Dept: FAMILY MEDICINE | Facility: CLINIC | Age: 65
End: 2025-02-24
Payer: MEDICARE

## 2025-02-24 NOTE — TELEPHONE ENCOUNTER
Patient calling requesting appt to receive TDAP vaccine. She states she went to [facility] to get this however they would not do it due to her insurance.     Pt requesting to complete this at clinic if possible. Pt scheduled for nurse only/MA visit for tomorrow for this. Writer relayed if there are any scheduling conflicts to receive this, someone with care team will reach out to her. Pt verbalized understanding and thanked for call.

## 2025-02-25 ENCOUNTER — TELEPHONE (OUTPATIENT)
Dept: FAMILY MEDICINE | Facility: CLINIC | Age: 65
End: 2025-02-25

## 2025-02-25 NOTE — TELEPHONE ENCOUNTER
Pt calling wondering what brand of TDAP we carry at the clinic. Verified the brand is Adacel with floor staff. Pt's insurance covers this. Rescheduled appt.

## 2025-03-03 ENCOUNTER — ALLIED HEALTH/NURSE VISIT (OUTPATIENT)
Dept: FAMILY MEDICINE | Facility: CLINIC | Age: 65
End: 2025-03-03
Payer: MEDICARE

## 2025-03-03 VITALS — TEMPERATURE: 98.2 F

## 2025-03-03 DIAGNOSIS — Z23 ENCOUNTER FOR IMMUNIZATION: Primary | ICD-10-CM

## 2025-03-03 PROCEDURE — 90471 IMMUNIZATION ADMIN: CPT

## 2025-03-03 PROCEDURE — 99207 PR NO CHARGE NURSE ONLY: CPT

## 2025-03-03 PROCEDURE — 90715 TDAP VACCINE 7 YRS/> IM: CPT

## 2025-03-03 NOTE — PROGRESS NOTES
Prior to immunization administration, verified patients identity using patient s name and date of birth. Please see Immunization Activity for additional information.     Is the patient's temperature normal (100.5 or less)? Yes     Patient MEETS CRITERIA. PROCEED with vaccine administration.        3/3/2025   General Questionnaire    Do you have any questions for your care team about the vaccines you will be receiving today? no             3/3/2025   TD/TDAP   Have you had a serious reaction to a Td or Tdap vaccine or to something in these vaccines? No   Have you had coma, fainting, or seizures (encephalopathy) within 1 week of getting a DTP, DTaP, or Tdap vaccine? No   Have you had a serious reaction to thimerosal? No   Have you had a reaction (swelling, bleeding, gangrene) to a tetanus, diphtheria, or meningococcal vaccine? No   Have you had Guillain-[place] syndrome within 6 weeks of getting a vaccine? No   Do you have seizures that aren't controlled, encephalopathy that's getting worse, or a brain disorder that's unstable or getting worse? No   Do you have an allergy to latex? No   Have you had a puncture wound, bite wound, wound with something in it, or dirty wound in the past 3 weeks? No         Patient MEETS CRITERIA. PROCEED with vaccine administration.     TDAP PREFERRED. TD acceptable if requested by the patient.      Patient instructed to remain in clinic for 15 minutes afterwards, and to report any adverse reactions.      Link to Ancillary Visit Immunization Standing Orders SmartSet     Screening performed by Dae Vogel RN on 3/3/2025 at 10:22 AM.

## 2025-04-22 ENCOUNTER — MYC MEDICAL ADVICE (OUTPATIENT)
Dept: INTERNAL MEDICINE | Facility: CLINIC | Age: 65
End: 2025-04-22
Payer: MEDICARE

## 2025-04-24 ENCOUNTER — NURSE TRIAGE (OUTPATIENT)
Dept: FAMILY MEDICINE | Facility: CLINIC | Age: 65
End: 2025-04-24
Payer: MEDICARE
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
## 2025-04-24 NOTE — TELEPHONE ENCOUNTER
Can continue with Naproxen.   Recommend ice or heat for 20 min to shoulder ever 1-2 hours and could try icy hot.  If not improving or any worsening should be seen.       Pt expressed understanding and acceptance of the plan. had no further questions at this time.  Advised can call back to clinic at any time with concerns.      Meli Childs RN

## 2025-05-05 ENCOUNTER — OFFICE VISIT (OUTPATIENT)
Dept: ORTHOPEDICS | Facility: CLINIC | Age: 65
End: 2025-05-05
Attending: FAMILY MEDICINE
Payer: MEDICARE

## 2025-05-05 DIAGNOSIS — M25.552 BILATERAL HIP PAIN: ICD-10-CM

## 2025-05-05 DIAGNOSIS — M25.551 GREATER TROCHANTERIC PAIN SYNDROME OF BOTH LOWER EXTREMITIES: Primary | ICD-10-CM

## 2025-05-05 DIAGNOSIS — M76.31 IT BAND SYNDROME, RIGHT: ICD-10-CM

## 2025-05-05 DIAGNOSIS — M25.552 GREATER TROCHANTERIC PAIN SYNDROME OF BOTH LOWER EXTREMITIES: Primary | ICD-10-CM

## 2025-05-05 DIAGNOSIS — M25.551 BILATERAL HIP PAIN: ICD-10-CM

## 2025-05-05 DIAGNOSIS — M67.919 TENDINOPATHY OF ROTATOR CUFF, UNSPECIFIED LATERALITY: ICD-10-CM

## 2025-05-05 PROBLEM — M70.62 TROCHANTERIC BURSITIS OF BOTH HIPS: Status: RESOLVED | Noted: 2024-10-31 | Resolved: 2025-05-05

## 2025-05-05 PROBLEM — M70.61 TROCHANTERIC BURSITIS OF BOTH HIPS: Status: RESOLVED | Noted: 2024-10-31 | Resolved: 2025-05-05

## 2025-05-05 PROCEDURE — 99213 OFFICE O/P EST LOW 20 MIN: CPT | Performed by: STUDENT IN AN ORGANIZED HEALTH CARE EDUCATION/TRAINING PROGRAM

## 2025-05-05 NOTE — PROGRESS NOTES
ASSESSMENT & PLAN    Diagnoses and all orders for this visit:    Greater trochanteric pain syndrome of both lower extremities  -     Physical Therapy  Referral; Future  -     Sports Med Adult Follow-Up Clinic Order (As Needed); Future    Bilateral hip pain  -     Orthopedic  Referral    Tendinopathy of rotator cuff, unspecified laterality    It band syndrome, right      Patient Instructions   You have greater trochanteric pain syndrome from weak glute medius and irritation of the tendon  Schedule PT to work on hip stabilization  Your shoulders are likely related to rotator cuff tendinopathy which is inflammation and irritation of the tendon. I would recommend getting some exercises from PT to help with stabilization and strengthening  Follow up in 4-6 weeks if no improvement in symptoms  Place an ice pack (you can use a bag of frozen vegetables or other commercial products) over the injured area.  To avoid tissue injury from the cold, place a cloth or towel between the ice and the skin.  It is recommended that ice can be applied 3-5 times a day for up to 20 minutes at a time during the first 24 to 72 hours.  Can repeat every 2 hours as needed.  Can also use voltaren gel to help with symptoms  Or oral medications like naproxen               Jonathon Vincent MD  [facility] [place]    -----------------------------------------------------------------------------------------      SUBJECTIVE  Andreia Jorgensen is a/an 65 year old who has bilateral hip pain. Previously treated by [facility] in 2013 with physical therapy and conservative treatment. Patient was pain free for several years until returning to [facility] last year due to right hip pain.     Reason for Visit:  Injured/painful/body part: Bilateral hip, R > L  What are your symptoms: Dull, achy, giving out  Date of injury/Onset: 1 year  Cause: Reports insidious onset without acute precipitating  event.  History of similar pain: Yes, 2013 treated at [place] [facility]  What makes it better: Gr Troc Inj, Naproxen, Salonpas  What makes it worse: Longalking, stairs, ER, Crossing legs  What have you done for this problem: MSK Treatments Tried: Injections (most recent date: Oct 2024) that provided  3 month(s) of relief, Naproxen, Salonpas    Previous surgeries: None  Social History/Occupation:  Retired, Gardening      REVIEW OF SYSTEMS:  Positive ROS was noted in the HPI, otherwise negative.       OBJECTIVE:  Gen: no acute distress  Hip exam  Examination limited to bilateral  hip    Inspection:  Gait normal  Hip region appeared grossly normal without deformity  No open wounds or rashes appreciated    TTP:  Iliopsoas  General anterior groin soft tissue/musculature: -  Greater trochanteric bursa: +  Gluteal tendons: +  Piriformis: -  IT band: +  ASIS: -    Range of motion:  Flexion: 125    With the hip and knee at 90 degrees of flexion   IR: 30, pain elicited   ER: 50, no pain elicited    Strength:  Flexion: 5/5  Adduction: 5/5  Abduction: 5/5    Special Testing:  Logroll:  negative  FADIR:  negative  OTTO: positive  Stinchfield:  negative  Erma:  positive  Piriformis:  negative    SI / LBP:  Lumbar paraspinal musculature not  tender  SI joint: mildly tender  Slump testing: -  Iliac distraction: -  Iliac compression: -  Sacral thrust: -      Sensation throughout the hip was grossly normal  Neurovascularly intact          RADIOLOGY:  Previous imaging reviewed and listed are the impressions: XR Pelvis and Hips 10/08/24 IMPRESSION: No acute fracture or subluxation. Joint spaces are preserved. 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 Calcification along the superior right acetabulum.

## 2025-05-05 NOTE — LETTER
5/5/2025      Andreia Jorgensen  [address]  [place] [address]      Dear Colleague,    Thank you for referring your patient, Andreia Jorgensen, to the [facility] [place]. Please see a copy of my visit note below.    ASSESSMENT & PLAN    Diagnoses and all orders for this visit:    Greater trochanteric pain syndrome of both lower extremities  -     Physical Therapy  Referral; Future  -     Sports Med Adult Follow-Up Clinic Order (As Needed); Future    Bilateral hip pain  -     Orthopedic  Referral    Tendinopathy of rotator cuff, unspecified laterality    It band syndrome, right      Patient Instructions   You have greater trochanteric pain syndrome from weak glute medius and irritation of the tendon  Schedule PT to work on hip stabilization  Your shoulders are likely related to rotator cuff tendinopathy which is inflammation and irritation of the tendon. I would recommend getting some exercises from PT to help with stabilization and strengthening  Follow up in 4-6 weeks if no improvement in symptoms  Place an ice pack (you can use a bag of frozen vegetables or other commercial products) over the injured area.  To avoid tissue injury from the cold, place a cloth or towel between the ice and the skin.  It is recommended that ice can be applied 3-5 times a day for up to 20 minutes at a time during the first 24 to 72 hours.  Can repeat every 2 hours as needed.  Can also use voltaren gel to help with symptoms  Or oral medications like naproxen               Jonathon Vincent MD  [facility] SPORTS MEDIC[facility]    -----------------------------------------------------------------------------------------      SUBJECTIVE  Andreia Jorgensen is a/an 65 year old who has bilateral hip pain. Previously treated by [facility] in 2013 with physical therapy and conservative treatment. Patient was pain free for several years until  returning to [facility] last year due to right hip pain.     Reason for Visit:  Injured/painful/body part: Bilateral hip, R > L  What are your symptoms: Dull, achy, giving out  Date of injury/Onset: 1 year  Cause: Reports insidious onset without acute precipitating event.  History of similar pain: Yes, 2013 treated at [place]  What makes it better: Gr Troc Inj, Naproxen, Salonpas  What makes it worse: Longalking, stairs, ER, Crossing legs  What have you done for this problem: MSK Treatments Tried: Injections (most recent date: Oct 2024) that provided  3 month(s) of relief, Naproxen, Salonpas    Previous surgeries: None  Social History/Occupation:  Retired, Gardening      REVIEW OF SYSTEMS:  Positive ROS was noted in the HPI, otherwise negative.       OBJECTIVE:  Gen: no acute distress  Hip exam  Examination limited to bilateral  hip    Inspection:  Gait normal  Hip region appeared grossly normal without deformity  No open wounds or rashes appreciated    TTP:  Iliopsoas  General anterior groin soft tissue/musculature: -  Greater trochanteric bursa: +  Gluteal tendons: +  Piriformis: -  IT band: +  ASIS: -    Range of motion:  Flexion: 125    With the hip and knee at 90 degrees of flexion   IR: 30, pain elicited   ER: 50, no pain elicited    Strength:  Flexion: 5/5  Adduction: 5/5  Abduction: 5/5    Special Testing:  Logroll:  negative  FADIR:  negative  OTTO: positive  Stinchfield:  negative  Erma:  positive  Piriformis:  negative    SI / LBP:  Lumbar paraspinal musculature not  tender  SI joint: mildly tender  Slump testing: -  Iliac distraction: -  Iliac compression: -  Sacral thrust: -      Sensation throughout the hip was grossly normal  Neurovascularly intact          RADIOLOGY:  Previous imaging reviewed and listed are the impressions: XR Pelvis and Hips 10/08/24 IMPRESSION: No acute fracture or subluxation. Joint spaces are preserved. 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 Calcification along the superior  right acetabulum.     Again, thank you for allowing me to participate in the care of your patient.        Sincerely,        Jonathon Vincent MD    Electronically signed

## 2025-05-05 NOTE — PATIENT INSTRUCTIONS
You have greater trochanteric pain syndrome from weak glute medius and irritation of the tendon  Schedule PT to work on hip stabilization  Your shoulders are likely related to rotator cuff tendinopathy which is inflammation and irritation of the tendon. I would recommend getting some exercises from PT to help with stabilization and strengthening  Follow up in 4-6 weeks if no improvement in symptoms  Place an ice pack (you can use a bag of frozen vegetables or other commercial products) over the injured area.  To avoid tissue injury from the cold, place a cloth or towel between the ice and the skin.  It is recommended that ice can be applied 3-5 times a day for up to 20 minutes at a time during the first 24 to 72 hours.  Can repeat every 2 hours as needed.  Can also use voltaren gel to help with symptoms  Or oral medications like naproxen

## 2025-05-07 ENCOUNTER — THERAPY VISIT (OUTPATIENT)
Dept: PHYSICAL THERAPY | Facility: CLINIC | Age: 65
End: 2025-05-07
Payer: MEDICARE

## 2025-05-07 DIAGNOSIS — M25.552 GREATER TROCHANTERIC PAIN SYNDROME OF BOTH LOWER EXTREMITIES: ICD-10-CM

## 2025-05-07 DIAGNOSIS — M25.551 GREATER TROCHANTERIC PAIN SYNDROME OF BOTH LOWER EXTREMITIES: ICD-10-CM

## 2025-05-07 PROCEDURE — 97110 THERAPEUTIC EXERCISES: CPT | Mod: GP

## 2025-05-07 PROCEDURE — 97161 PT EVAL LOW COMPLEX 20 MIN: CPT | Mod: GP

## 2025-05-07 ASSESSMENT — ACTIVITIES OF DAILY LIVING (ADL)
GOING UP 1 FLIGHT OF STAIRS: SLIGHT DIFFICULTY
PUTTING ON SOCKS AND SHOES: SLIGHT DIFFICULTY
HOW_WOULD_YOU_RATE_YOUR_CURRENT_LEVEL_OF_FUNCTION_DURING_YOUR_SPORTS_RELATED_ACTIVITIES_FROM_0_TO_100_WITH_100_BEING_YOUR_LEVEL_OF_FUNCTION_PRIOR_TO_YOUR_HIP_PROBLEM_AND_0_BEING_THE_INABILITY_TO_PERFORM_ANY_OF_YOUR_USUAL_DAILY_ACTIVITIES?: 50
SITTING_FOR_15_MINUTES: SLIGHT DIFFICULTY
STANDING_FOR_15_MINUTES: SLIGHT DIFFICULTY
GOING DOWN 1 FLIGHT OF STAIRS: SLIGHT DIFFICULTY
WALKING_UP_STEEP_HILLS: SLIGHT DIFFICULTY
ADL_SCORE(%): 0
WALKING_15_MINUTES_OR_GREATER: NO DIFFICULTY AT ALL
HOW_WOULD_YOU_RATE_YOUR_CURRENT_LEVEL_OF_FUNCTION_DURING_YOUR_USUAL_ACTIVITIES_OF_DAILY_LIVING_FROM_0_TO_100_WITH_100_BEING_YOUR_LEVEL_OF_FUNCTION_PRIOR_TO_YOUR_HIP_PROBLEM_AND_0_BEING_THE_INABILITY_TO_PERFORM_ANY_OF_YOUR_USUAL_DAILY_ACTIVITIES?: 50
HOW_WOULD_YOU_RATE_YOUR_CURRENT_LEVEL_OF_FUNCTION?: ABNORMAL
WALKING_UP_STEEP_HILLS: SLIGHT DIFFICULTY
JUMPING: SLIGHT DIFFICULTY
GETTING_INTO_AND_OUT_OF_AN_AVERAGE_CAR: SLIGHT DIFFICULTY
STARTING_AND_STOPPING_QUICKLY: SLIGHT DIFFICULTY
HOS_ADL_HIGHEST_POTENTIAL_SCORE: 68
HEAVY_WORK: SLIGHT DIFFICULTY
SPORTS_HIGHEST_POTENTIAL_SCORE: 36
DEEP SQUATTING: NO DIFFICULTY AT ALL
RUNNING_ONE_MILE: UNABLE TO DO
GETTING INTO AND OUT OF AN AVERAGE CAR: SLIGHT DIFFICULTY
ADL_HIGHEST_POTENTIAL_SCORE: 68
GETTING_INTO_AND_OUT_OF_A_BATHTUB: SLIGHT DIFFICULTY
WALKING_FOR_APPROXIMATELY_10_MINUTES: NO DIFFICULTY AT ALL
LOW_IMPACT_ACTIVITIES_LIKE_FAST_WALKING: NO DIFFICULTY AT ALL
SPORTS_SCORE(%): 0
RECREATIONAL ACTIVITIES: SLIGHT DIFFICULTY
LIGHT_TO_MODERATE_WORK: SLIGHT DIFFICULTY
GOING_DOWN_1_FLIGHT_OF_STAIRS: SLIGHT DIFFICULTY
STEPPING_UP_AND_DOWN_CURBS: SLIGHT DIFFICULTY
HOS_ADL_ITEM_SCORE_TOTAL: 56
ROLLING OVER IN BED: SLIGHT DIFFICULTY
SITTING FOR 15 MINUTES: SLIGHT DIFFICULTY
LIGHT_TO_MODERATE_WORK: SLIGHT DIFFICULTY
WALKING_DOWN_STEEP_HILLS: NO DIFFICULTY AT ALL
GOING_UP_1_FLIGHT_OF_STAIRS: SLIGHT DIFFICULTY
SPORTS_TOTAL_ITEM_SCORE: 0
TWISTING/PIVOTING ON INVOLVED LEG: NO DIFFICULTY AT ALL
HOS_ADL_SCORE(%): 82.35
CUTTING/LATERAL_MOVEMENTS: SLIGHT DIFFICULTY
HOW_WOULD_YOU_RATE_YOUR_CURRENT_LEVEL_OF_FUNCTION_DURING_YOUR_USUAL_ACTIVITIES_OF_DAILY_LIVING_FROM_0_TO_100_WITH_100_BEING_YOUR_LEVEL_OF_FUNCTION_PRIOR_TO_YOUR_HIP_PROBLEM_AND_0_BEING_THE_INABILITY_TO_PERFORM_ANY_OF_YOUR_USUAL_DAILY_ACTIVITIES?: 50
ADL_COUNT: 17
SPORTS_COUNT: 9
DEEP_SQUATTING: NO DIFFICULTY AT ALL
GETTING_INTO_AND_OUT_OF_A_BATHTUB: SLIGHT DIFFICULTY
ROLLING_OVER_IN_BED: SLIGHT DIFFICULTY
WALKING_INITIALLY: SLIGHT DIFFICULTY
WALKING_DOWN_STEEP_HILLS: NO DIFFICULTY AT ALL
ADL_TOTAL_ITEM_SCORE: 0
STEPPING UP AND DOWN CURBS: SLIGHT DIFFICULTY
TWISTING/PIVOTING_ON_INVOLVED_LEG: NO DIFFICULTY AT ALL
PUTTING_ON_SOCKS_AND_SHOES: SLIGHT DIFFICULTY
ABILITY_TO_PARTICIPATE_IN_YOUR_DESIRED_SPORT_AS_LONG_AS_YOU_WOULD_LIKE: SLIGHT DIFFICULTY
WALKING_APPROXIMATELY_10_MINUTES: NO DIFFICULTY AT ALL
WALKING_INITIALLY: SLIGHT DIFFICULTY
HEAVY_WORK: SLIGHT DIFFICULTY
RECREATIONAL_ACTIVITIES: SLIGHT DIFFICULTY
STANDING FOR 15 MINUTES: SLIGHT DIFFICULTY
WALKING_15_MINUTES_OR_GREATER: NO DIFFICULTY AT ALL
PLEASE_INDICATE_YOR_PRIMARY_REASON_FOR_REFERRAL_TO_THERAPY:: HIP

## 2025-05-07 NOTE — PROGRESS NOTES
PHYSICAL THERAPY EVALUATION  Type of Visit: Evaluation       Fall Risk Screen:  Have you fallen 2 or more times in the past year?: No  Have you fallen and had an injury in the past year?: No  Is patient receiving Physical Therapy Services?: No    Subjective         Presenting condition or subjective complaint:    Date of onset: 05/07/25    Relevant medical history:     Dates & types of surgery:      Prior diagnostic imaging/testing results:       Prior therapy history for the same diagnosis, illness or injury:        Prior Level of Function  Transfers:   Ambulation:   ADL:   IADL:     Living Environment  Social support:     Type of home:     Stairs to enter the home:         Ramp:     Stairs inside the home:         Help at home:    Equipment owned:       Employment:      Hobbies/Interests:      Patient goals for therapy:      Physical Therapist Assessment    KEY PT FINDINGS:  1) Pain and weakness with resisted Hip ABD on RLE  2) TTP to glut medius and GT on RLE  3) Negative lumbar screen    Signs and symptoms are consistent with GTPS.      Subjective History    Patient was referred by Jonathon Vincent for Greater trochanteric pain syndrome of both lower extremities [M25.551, M25.552]   Referring provider plan: You have greater trochanteric pain syndrome from weak glute medius and irritation of the tendon  Schedule PT to work on hip stabilization  Your shoulders are likely related to rotator cuff tendinopathy which is inflammation and irritation of the tendon. I would recommend getting some exercises from PT to help with stabilization and strengthening  Follow up in 4-6 weeks if no improvement in symptoms  Place an ice pack (you can use a bag of frozen vegetables or other commercial products) over the injured area.  To avoid tissue injury from the cold, place a cloth or towel between the ice and the skin.  It is recommended that ice can be applied 3-5 times a day for up to 20 minutes at a time during the first 24  to 72 hours.  Can repeat every 2 hours as needed.  Can also use voltaren gel to help with symptoms  Or oral medications like naproxen    PT Subjective:   Patient is a 65 year old female presenting to outpatient physical therapy with bilateral hip pain. Has had hip pain off and on for years, felt that her right hip got aggravated after a step class a few months ago. She had to stop the exercises due to the pain. She went to [facility] and received a CSI which helped but the pain came back in January. She also can feel some slight symptoms on the left side. Right side has always been tighter. It's typically worse and tighter in the mornings when waking. Prolonged walking can make it worse. Sitting for a while also makes it worse. Going up stairs and getting into vehicles is also a bit challenging. Main pain is located posterolateral hip with tightness in IT band and quads. Denies any numbness or tingling. She does note some instability in her hip as well, especially with going down the stairs.  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 But for the first two weeks use daily 24 tablet 3    valACYclovir (VALTREX) 500 MG tablet TAKE 1 TABLET(500  MG) BY MOUTH TWICE DAILY 18 tablet 2     No current facility-administered medications for this visit.           Imaging: X-Ray Hip Left: IMPRESSION: No acute fracture or subluxation. Joint spaces are preserved. 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 Calcification along the superior right acetabulum.     Red Flag Screening: negative  Prior Treatment Includes: PT, injections  Lifestyle History:  Occupation: Retired  Experience with physical activity: Walks, Exercise, Daily Activities  General Health Assessment: NT.  Referral recommended? No  Additional Considerations: Maybe has a history of LB injury that may or may not be contributing    Patient Goals for Physical Therapy: Walk more than 2 miles; more resistance training; biking         Objective   Objective Examination    Static Posture/Observation  Hip adduction on R    Gait: Uncompensated Trandelenburg R>L    Dynamic Movement Screen  Single leg stance :   RIGHT: Not assessed  LEFT:  Not assessed  Double limb squat:   Anterior knee translation and Improper use of glutes/hips  Single limb squat:   RIGHT: Not assessed  LEFT: Not assessed    Lumbar Spine Screen:   ROM: WFL  Repeated Movements: No Better  No Worse  No Effect    SIJ Screen:   Compression/Approximation: negative  Gapping/Distraction: negative  Sacral Thrust: negative  Thigh Thrust: negative  OTHER:    HIP Range of Motion: (* indicates patient's primary complaint)   AROM R PROM R AROM L PROM L MMT R MMT L   Flexion  WNL  WNL 4/5 4/5   Extension         ER  Mod limit*  Min limit     IR  WNL  WNL     Prone IR     X X   Abduction     3+/5* 4-/5   Adduction         - Supine X X X X     - Hooklying X X X X     - 90/90 X X X X       KNEE AROM R PROM R AROM L PROM L MMT R MMT L   Extension  WNL  WNL 5/5 5/5   Flexion  WNL  WNL 5/5 5/5       Special tests: blank if not tested; * indicates patient's primary complaint   Right Left    Muscle Testing   Flynn Montoya's     Hamstring 90/90      Piriformis      Intra-Articular Testing   FADIR - -   Scour - -   Ligament. Teres     Log Roll - -    Neural Tension   SLR - -   Slump     Femoral Nerve      Other   OTTO + -   Fulcrum       Palpation  Tender to palpation at:   -RIGHT: gluteus medius muscle belly, gluteus medius tendon insertion, and TFL muscle belly  -LEFT:  TFL muscle belly        Assessment & Plan   CLINICAL IMPRESSIONS  Medical Diagnosis:  (Greater trochanteric pain syndrome of both lower extremities (M25.551, M25.552))    Treatment Diagnosis: Bilateral Hip Pain   Impression/Assessment: Patient is a 65 year old female with bilateral hip complaints.  The following significant findings have been identified: Pain, Decreased ROM/flexibility, Decreased joint mobility, Decreased strength, Impaired gait, Impaired muscle performance, and Decreased activity tolerance. These impairments interfere with their ability to perform self care tasks, work tasks, recreational activities, household chores, driving , household mobility, and community mobility as compared to previous level of function.     Clinical Decision Making (Complexity):  Clinical Presentation: Stable/Uncomplicated  Clinical Presentation Rationale: based on medical and personal factors listed in PT evaluation  Clinical Decision Making (Complexity): Low complexity    PLAN OF CARE  Treatment Interventions:  Interventions: Gait Training, Manual Therapy, Neuromuscular Re-education, Therapeutic Activity, Therapeutic Exercise    Long Term Goals     PT Goal 1  Goal Identifier: LTG1  Goal Description: Patient will be able to go up and down one flight of stairs with 0/10 hip pain  Rationale: to maximize safety and independence with performance of ADLs and functional tasks  Target Date: 07/02/25  PT Goal 2  Goal Identifier: LTG2  Goal Description: Patient will report an improvement of at least 10 on the HOS to demonstrate MCID  Rationale: to maximize safety and independence with performance of ADLs  and functional tasks;to maximize safety and independence within the home;to maximize safety and independence within the community;to maximize safety and independence with transportation;to maximize safety and independence with self cares  Target Date: 07/02/25      Frequency of Treatment: 1x/wk  Duration of Treatment: 8 weeks    Recommended Referrals to Other Professionals:   Education Assessment:   Learner/Method: Patient    Risks and benefits of evaluation/treatment have been explained.   Patient/Family/caregiver agrees with Plan of Care.     Evaluation Time:     PT Eval, Low Complexity Minutes (38452): 25       Signing Clinician: SAMARA Morel [facility]                                                                                   OUTPATIENT PHYSICAL THERAPY      PLAN OF TREATMENT FOR OUTPATIENT REHABILITATION   Patient's Last Name, First Name, LIBERTYAndreia Mccann YOB: 1960   Provider's Name   [facility]   Medical Record No.  [id number]     Onset Date: 05/07/25  Start of Care Date: 05/07/25     Medical Diagnosis:   (Greater trochanteric pain syndrome of both lower extremities (M25.551, M25.552))      PT Treatment Diagnosis:  Bilateral Hip Pain Plan of Treatment  Frequency/Duration: 1x/wk/ 8 weeks    Certification date from 05/07/25 to 07/02/25         See note for plan of treatment details and functional goals     Chris Valentino, PT                         I CERTIFY THE NEED FOR THESE SERVICES FURNISHED UNDER        THIS PLAN OF TREATMENT AND WHILE UNDER MY CARE     (Physician attestation of this document indicates review and certification of the therapy plan).              Referring Provider:  Jonathon Vincent    Initial Assessment  See Epic Evaluation- Start of Care Date: 05/07/25

## 2025-05-12 ENCOUNTER — THERAPY VISIT (OUTPATIENT)
Dept: PHYSICAL THERAPY | Facility: CLINIC | Age: 65
End: 2025-05-12
Payer: MEDICARE

## 2025-05-12 DIAGNOSIS — M25.551 GREATER TROCHANTERIC PAIN SYNDROME OF BOTH LOWER EXTREMITIES: Primary | ICD-10-CM

## 2025-05-12 DIAGNOSIS — M25.552 GREATER TROCHANTERIC PAIN SYNDROME OF BOTH LOWER EXTREMITIES: Primary | ICD-10-CM

## 2025-05-12 PROCEDURE — 97140 MANUAL THERAPY 1/> REGIONS: CPT | Mod: GP

## 2025-05-12 PROCEDURE — 97110 THERAPEUTIC EXERCISES: CPT | Mod: GP

## 2025-06-02 ENCOUNTER — MYC REFILL (OUTPATIENT)
Dept: FAMILY MEDICINE | Facility: CLINIC | Age: 65
End: 2025-06-02
Payer: MEDICARE

## 2025-06-02 DIAGNOSIS — Z86.19 H/O COLD SORES: ICD-10-CM

## 2025-06-03 ENCOUNTER — THERAPY VISIT (OUTPATIENT)
Dept: PHYSICAL THERAPY | Facility: CLINIC | Age: 65
End: 2025-06-03
Payer: MEDICARE

## 2025-06-03 DIAGNOSIS — Z86.19 H/O COLD SORES: ICD-10-CM

## 2025-06-03 DIAGNOSIS — M25.551 GREATER TROCHANTERIC PAIN SYNDROME OF BOTH LOWER EXTREMITIES: Primary | ICD-10-CM

## 2025-06-03 DIAGNOSIS — M25.552 GREATER TROCHANTERIC PAIN SYNDROME OF BOTH LOWER EXTREMITIES: Primary | ICD-10-CM

## 2025-06-03 PROCEDURE — 97110 THERAPEUTIC EXERCISES: CPT | Mod: GP

## 2025-06-03 RX ORDER — VALACYCLOVIR HYDROCHLORIDE 500 MG/1
500 TABLET, FILM COATED ORAL 2 TIMES DAILY
Qty: 18 TABLET | Refills: 2 | Status: SHIPPED | OUTPATIENT
Start: 2025-06-03

## 2025-06-04 RX ORDER — VALACYCLOVIR HYDROCHLORIDE 500 MG/1
500 TABLET, FILM COATED ORAL 2 TIMES DAILY
Qty: 18 TABLET | Refills: 2 | OUTPATIENT
Start: 2025-06-04

## 2025-06-18 ENCOUNTER — THERAPY VISIT (OUTPATIENT)
Dept: PHYSICAL THERAPY | Facility: CLINIC | Age: 65
End: 2025-06-18
Payer: MEDICARE

## 2025-06-18 DIAGNOSIS — M25.551 GREATER TROCHANTERIC PAIN SYNDROME OF BOTH LOWER EXTREMITIES: Primary | ICD-10-CM

## 2025-06-18 DIAGNOSIS — M25.552 GREATER TROCHANTERIC PAIN SYNDROME OF BOTH LOWER EXTREMITIES: Primary | ICD-10-CM

## 2025-06-18 PROCEDURE — 97110 THERAPEUTIC EXERCISES: CPT | Mod: GP

## 2025-06-30 ENCOUNTER — THERAPY VISIT (OUTPATIENT)
Dept: PHYSICAL THERAPY | Facility: CLINIC | Age: 65
End: 2025-06-30
Payer: MEDICARE

## 2025-06-30 DIAGNOSIS — M25.552 GREATER TROCHANTERIC PAIN SYNDROME OF BOTH LOWER EXTREMITIES: Primary | ICD-10-CM

## 2025-06-30 DIAGNOSIS — M25.551 GREATER TROCHANTERIC PAIN SYNDROME OF BOTH LOWER EXTREMITIES: Primary | ICD-10-CM

## 2025-06-30 PROCEDURE — 97110 THERAPEUTIC EXERCISES: CPT | Mod: GP

## 2025-07-10 ENCOUNTER — OFFICE VISIT (OUTPATIENT)
Dept: OPHTHALMOLOGY | Facility: CLINIC | Age: 65
End: 2025-07-10
Attending: OPHTHALMOLOGY
Payer: MEDICARE

## 2025-07-10 DIAGNOSIS — H40.001 GLAUCOMA SUSPECT OF RIGHT EYE: ICD-10-CM

## 2025-07-10 DIAGNOSIS — H25.813 MIXED TYPE AGE-RELATED CATARACT, BOTH EYES: Primary | ICD-10-CM

## 2025-07-10 PROCEDURE — G0463 HOSPITAL OUTPT CLINIC VISIT: HCPCS | Performed by: OPHTHALMOLOGY

## 2025-07-10 PROCEDURE — 92133 CPTRZD OPH DX IMG PST SGM ON: CPT | Performed by: OPHTHALMOLOGY

## 2025-07-10 ASSESSMENT — CONF VISUAL FIELD
OD_NORMAL: 1
OD_INFERIOR_NASAL_RESTRICTION: 0
OS_SUPERIOR_TEMPORAL_RESTRICTION: 0
OS_SUPERIOR_NASAL_RESTRICTION: 0
OD_SUPERIOR_NASAL_RESTRICTION: 0
OD_SUPERIOR_TEMPORAL_RESTRICTION: 0
OD_INFERIOR_TEMPORAL_RESTRICTION: 0
METHOD: COUNTING FINGERS
OS_INFERIOR_NASAL_RESTRICTION: 0
OS_INFERIOR_TEMPORAL_RESTRICTION: 0
OS_NORMAL: 1

## 2025-07-10 ASSESSMENT — REFRACTION_WEARINGRX
OD_CYLINDER: +0.50
OS_SPHERE: +1.25
OS_AXIS: 005
OD_ADD: +2.50
OD_AXIS: 013
SPECS_TYPE: BIFOCAL
OS_CYLINDER: +0.50
OD_SPHERE: -0.50
OS_ADD: +2.50

## 2025-07-10 ASSESSMENT — SLIT LAMP EXAM - LIDS
COMMENTS: BLEPHARITIS
COMMENTS: BLEPHARITIS

## 2025-07-10 ASSESSMENT — VISUAL ACUITY
OS_BAT_HIGH: 20/40
OS_BAT_MED: 20/30
OS_BAT_LOW: 20/25
OD_BAT_MED: 20/40
OD_BAT_HIGH: 20/50
OD_CC+: -2
OS_CC: 20/25
CORRECTION_TYPE: GLASSES
OD_BAT_LOW: 20/30
OD_CC: 20/25
OS_CC+: -1
METHOD: SNELLEN - LINEAR

## 2025-07-10 ASSESSMENT — REFRACTION_MANIFEST
OD_CYLINDER: +0.50
OS_AXIS: 005
OS_SPHERE: +1.75
OS_CYLINDER: +0.25
OD_AXIS: 013
OD_SPHERE: -0.50

## 2025-07-10 ASSESSMENT — TONOMETRY
OS_IOP_MMHG: 11
IOP_METHOD: ICARE
OD_IOP_MMHG: 13

## 2025-07-10 ASSESSMENT — EXTERNAL EXAM - LEFT EYE: OS_EXAM: NORMAL

## 2025-07-10 ASSESSMENT — CUP TO DISC RATIO
OS_RATIO: 0.2
OD_RATIO: 0.5

## 2025-07-10 ASSESSMENT — EXTERNAL EXAM - RIGHT EYE: OD_EXAM: NORMAL

## 2025-07-10 NOTE — PROGRESS NOTES
HPI       Cataract Evaluation    In both eyes.  Associated symptoms include blurred vision, glare, haloes, starburst and a need for brighter lights.  Pain was noted as 0/10. Additional comments: New Pt here for cat josemanuel.             Comments    Pt states vision is a little blurrier.  No pain.  Occasional sharp pain.  Increased glare/haloes RE>LE.  Needs brighter light when reading.  No increase in flashes.  No change to floaters.  Dx of Recurrent Erosion of Cornea.  Hx of ocular migraines.  AT's PRN.  No DM.    IRIS Ibrahim July 10, 2025 9:17 AM              Last edited by Yenni Cueto COT on 7/10/2025  9:27 AM.          Review of systems for the eyes was negative other than the pertinent positives/negatives listed in the HPI.      Assessment & Plan      Andreia Jorgensen is a 65 year old female with the following diagnoses:   1. Mixed type age-related cataract, both eyes    2. 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 Gonsalez at [facility] and recommended possible cataract surgery.  Here for 2nd opinion    Discussed multifactorial blur right eye due to K scar and cataract right eye > left eye   RBA to cataract extraction discussed  Will consider and call if interested in scheduling    Incidental C/D asymmetry noted  Has had OCT testing previously (records not available).  Sister with narrow angles  Unknown maximum intraocular pressure  Intraocular pressure 13/11 mm Hg today   OCT Nerve fiber layer baseline here within normal limits both eyes   Records requested  Monitor annually     Patient disposition:   Return in about 1 year (around 7/10/2026) for DFE, OCT NFL, Refraction.           Attending Physician Attestation:  Complete documentation of historical and exam elements from today's encounter can be found in the full encounter summary report (not  reduplicated in this progress note).  I personally obtained the chief complaint(s) and history of present illness.  I confirmed and edited as necessary the review of systems, past medical/surgical history, family history, social history, and examination findings as documented by others; and I examined the patient myself.  I personally reviewed the relevant tests, images, and reports as documented above.  I formulated and edited as necessary the assessment and plan and discussed the findings and management plan with the patient and family. . - Christopher Chaney MD

## 2025-07-28 ENCOUNTER — THERAPY VISIT (OUTPATIENT)
Dept: PHYSICAL THERAPY | Facility: CLINIC | Age: 65
End: 2025-07-28
Payer: MEDICARE

## 2025-07-28 DIAGNOSIS — M25.551 GREATER TROCHANTERIC PAIN SYNDROME OF BOTH LOWER EXTREMITIES: Primary | ICD-10-CM

## 2025-07-28 DIAGNOSIS — M25.552 GREATER TROCHANTERIC PAIN SYNDROME OF BOTH LOWER EXTREMITIES: Primary | ICD-10-CM

## 2025-07-28 PROCEDURE — 97140 MANUAL THERAPY 1/> REGIONS: CPT | Mod: GP

## 2025-07-28 PROCEDURE — 97110 THERAPEUTIC EXERCISES: CPT | Mod: GP

## 2025-07-28 NOTE — PROGRESS NOTES
07/28/25 0500   Appointment Info   Signing clinician's name / credentials Chris Kasnora PT, DPT   Total/Authorized Visits E and T (8 planned)   Visits Used 7   Medical Diagnosis   (Greater trochanteric pain syndrome of both lower extremities (M25.551, M25.552))   PT Tx Diagnosis Bilateral Hip Pain   Other pertinent information Road trip to [place] in December   Progress Note/Certification   Start of Care Date 05/07/25   Onset of illness/injury or Date of Surgery 05/07/25   Therapy Frequency 1x/wk   Predicted Duration 8 weeks   Certification date from 07/03/25   Certification date to 09/22/25   Progress Note Due Date 09/22/25   Progress Note Completed Date 05/07/25   GOALS   PT Goals 2   PT Goal 1   Goal Identifier [email or web address]   Goal Description Patient will be able to go up and down one flight of stairs with 0/10 hip pain   Rationale to maximize safety and independence with performance of ADLs and functional tasks   Goal Progress Progressing   Target Date 07/02/25   PT Goal 2   Goal Identifier LTG2   Goal Description Patient will report an improvement of at least 10 on the HOS to demonstrate MCID   Rationale to maximize safety and independence with performance of ADLs and functional tasks;to maximize safety and independence within the home;to maximize safety and independence within the community;to maximize safety and independence with transportation;to maximize safety and independence with self cares   Target Date 07/02/25   Subjective Report   Subjective Report Andreia notes over the past month she has been steadily improving though still can note some minor strain on her lateral hip. Overall is doing well and ready to start to transition to [facility].   Objective Measures   Objective Measures Objective Measure 1   Objective Measure 1   Objective Measure Hip ER PROM   Details 50 on R, 57 on L   Treatment Interventions (PT)   Interventions Therapeutic Procedure/Exercise;Manual Therapy   Therapeutic Procedure/Exercise    Therapeutic Procedures: strength, endurance, ROM, flexibility minutes (31850) 23   Therapeutic Procedures Ther Proc 2;Ther Proc 3;Ther Proc 4;Ther Proc 5;Ther Proc 6;Ther Proc 7   Ther Proc 1 Patient Education   Ther Proc 1 - Details discussed loading tolerance - variety of exercises but not increasing total volume - OK to walk, bike, pilates and exercise keeping in mind total load   Ther Proc 2 Bridging GTB   Ther Proc 2 - Details HEP   Ther Proc 3 Sidestepping GTB   Ther Proc 3 - Details x20ft each direction BTB   Ther Proc 4 modified to Standing EXT at Counter   Ther Proc 4 - Details VR   Ther Proc 5 [place] Hip ABD Iso Holds   Ther Proc 5 - Details VR   Ther Proc 6 [employer] BTB   Ther Proc 6 - Details x20ft each direction - added to HEP   Ther Proc 7 Self Hip Mob into ER on Chair   Ther Proc 7 - Details lateral band bull into ER x 10 reps - added to HEP   PTRx Ther Proc 1 Femoral nerve glide Prone   PTRx Ther Proc 1 - Details VR   PTRx Ther Proc 2 Standing Fire Hydrant   PTRx Ther Proc 2 - Details VR   Skilled Intervention Improve gluteal strengthening   Patient Response/Progress Patient tolerated well   Manual Therapy   Manual Therapy: Mobilization, MFR, MLD, friction massage minutes (46274) 10   Manual Therapy 1 Hip Mobilizations   Manual Therapy 1 - Details R - Distraction grade III   Skilled Intervention Improve joint mobility   Patient Response/Progress Test retest improved hip ER by 4 degrees   Education   Learner/Method Patient   Plan   Home program see PTRX   Plan for next session Follow up in one month   Total Session Time   Timed Code Treatment Minutes 33   Total Treatment Time (sum of timed and untimed services) 33         [facility]                                                                                   OUTPATIENT PHYSICAL THERAPY    PLAN OF TREATMENT FOR OUTPATIENT REHABILITATION   Patient's Last Name, First Name, Andreia Melgoza Date  of Birth:  1960   Provider's Name   [facility]   Medical Record No.  [id number]     Onset Date: 05/07/25  Start of Care Date: 05/07/25     Medical Diagnosis:   (Greater trochanteric pain syndrome of both lower extremities (M25.551, M25.552))      PT Treatment Diagnosis:  Bilateral Hip Pain Plan of Treatment  Frequency/Duration: 1x/wk/ 8 weeks    Certification date from 07/03/25 to 09/22/25         See note for plan of treatment details and functional goals     Chris Valentino, PT                         I CERTIFY THE NEED FOR THESE SERVICES FURNISHED UNDER        THIS PLAN OF TREATMENT AND WHILE UNDER MY CARE     (Physician attestation of this document indicates review and certification of the therapy plan).              Referring Provider:  Jonathon Vincent    Initial Assessment  See Epic Evaluation- Start of Care Date: 05/07/25            PLAN  Continue therapy per current plan of care. Anticipate 2 more sessions of PT total to solidify HEP.    Beginning/End Dates of Progress Note Reporting Period:  05/07/25 to 07/28/2025    Referring Provider:  Jonathon Vincent

## 2025-08-25 ENCOUNTER — THERAPY VISIT (OUTPATIENT)
Dept: PHYSICAL THERAPY | Facility: CLINIC | Age: 65
End: 2025-08-25
Payer: MEDICARE

## 2025-08-25 DIAGNOSIS — M25.552 GREATER TROCHANTERIC PAIN SYNDROME OF BOTH LOWER EXTREMITIES: Primary | ICD-10-CM

## 2025-08-25 DIAGNOSIS — M25.551 GREATER TROCHANTERIC PAIN SYNDROME OF BOTH LOWER EXTREMITIES: Primary | ICD-10-CM

## 2025-08-25 PROCEDURE — 97110 THERAPEUTIC EXERCISES: CPT | Mod: GP

## 2025-08-25 ASSESSMENT — ACTIVITIES OF DAILY LIVING (ADL)
GOING_UP_1_FLIGHT_OF_STAIRS: NO DIFFICULTY AT ALL
WALKING_INITIALLY: NO DIFFICULTY AT ALL
HEAVY_WORK: NO DIFFICULTY AT ALL
STANDING_FOR_15_MINUTES: NO DIFFICULTY AT ALL
SITTING_FOR_15_MINUTES: NO DIFFICULTY AT ALL
DEEP_SQUATTING: NO DIFFICULTY AT ALL
HOS_ADL_COUNT: 17
STEPPING_UP_AND_DOWN_CURBS: NO DIFFICULTY AT ALL
GETTING_INTO_AND_OUT_OF_AN_AVERAGE_CAR: NO DIFFICULTY AT ALL
WALKING_DOWN_STEEP_HILLS: NO DIFFICULTY AT ALL
ROLLING_OVER_IN_BED: SLIGHT DIFFICULTY
HOW_WOULD_YOU_RATE_YOUR_CURRENT_LEVEL_OF_FUNCTION_DURING_YOUR_USUAL_ACTIVITIES_OF_DAILY_LIVING_FROM_0_TO_100_WITH_100_BEING_YOUR_LEVEL_OF_FUNCTION_PRIOR_TO_YOUR_HIP_PROBLEM_AND_0_BEING_THE_INABILITY_TO_PERFORM_ANY_OF_YOUR_USUAL_DAILY_ACTIVITIES?: 95
RECREATIONAL_ACTIVITIES: NO DIFFICULTY AT ALL
HOS_ADL_ITEM_SCORE_TOTAL: 66
WALKING_15_MINUTES_OR_GREATER: NO DIFFICULTY AT ALL
HOS_ADL_SCORE(%): 97.06
LIGHT_TO_MODERATE_WORK: NO DIFFICULTY AT ALL
WALKING_APPROXIMATELY_10_MINUTES: NO DIFFICULTY AT ALL
TWISTING/PIVOTING_ON_INVOLVED_LEG: NO DIFFICULTY AT ALL
PUTTING_ON_SOCKS_AND_SHOES: NO DIFFICULTY AT ALL
HOS_ADL_HIGHEST_POTENTIAL_SCORE: 68
GOING_DOWN_1_FLIGHT_OF_STAIRS: NO DIFFICULTY AT ALL
WALKING_UP_STEEP_HILLS: SLIGHT DIFFICULTY
GETTING_INTO_AND_OUT_OF_A_BATHTUB: NO DIFFICULTY AT ALL